# Patient Record
Sex: FEMALE | Race: WHITE | Employment: FULL TIME | URBAN - METROPOLITAN AREA
[De-identification: names, ages, dates, MRNs, and addresses within clinical notes are randomized per-mention and may not be internally consistent; named-entity substitution may affect disease eponyms.]

---

## 2018-02-05 ENCOUNTER — HOSPITAL ENCOUNTER (OUTPATIENT)
Dept: LAB | Age: 48
Discharge: HOME | End: 2018-02-05
Payer: COMMERCIAL

## 2018-02-05 ENCOUNTER — HOSPITAL ENCOUNTER (OUTPATIENT)
Dept: OTHER | Age: 48
Discharge: HOME | End: 2018-02-05
Payer: COMMERCIAL

## 2018-02-05 ENCOUNTER — HOSPITAL ENCOUNTER (OUTPATIENT)
Dept: PREADMISSION TESTING | Age: 48
Discharge: HOME | End: 2018-02-05
Payer: COMMERCIAL

## 2018-02-05 DIAGNOSIS — Z01.818 PRE-OP TESTING: ICD-10-CM

## 2018-02-05 LAB
ABO + RH BLD: NORMAL
ALBUMIN SERPL-MCNC: 4.6 G/DL (ref 3.4–5)
ALBUMIN/GLOB SERPL: 1.4 {RATIO} (ref 0.8–1.7)
ALP SERPL-CCNC: 91 U/L (ref 45–117)
ALT SERPL-CCNC: 62 U/L (ref 13–56)
ANION GAP SERPL CALC-SCNC: 8 MMOL/L (ref 3–18)
APPEARANCE UR: CLEAR
APTT PPP: 28.1 SEC (ref 23–36.4)
AST SERPL-CCNC: 37 U/L (ref 15–37)
BASOPHILS # BLD: 0 K/UL (ref 0–0.06)
BASOPHILS NFR BLD: 1 % (ref 0–2)
BILIRUB SERPL-MCNC: 1.2 MG/DL (ref 0.2–1)
BILIRUB UR QL: NEGATIVE
BLOOD GROUP ANTIBODIES SERPL: NORMAL
BUN SERPL-MCNC: 18 MG/DL (ref 7–18)
BUN/CREAT SERPL: 20 (ref 12–20)
CALCIUM SERPL-MCNC: 9.5 MG/DL (ref 8.5–10.1)
CHLORIDE SERPL-SCNC: 103 MMOL/L (ref 100–108)
CO2 SERPL-SCNC: 29 MMOL/L (ref 21–32)
COLOR UR: YELLOW
CREAT SERPL-MCNC: 0.92 MG/DL (ref 0.6–1.3)
DIFFERENTIAL METHOD BLD: ABNORMAL
EOSINOPHIL # BLD: 0.1 K/UL (ref 0–0.4)
EOSINOPHIL NFR BLD: 1 % (ref 0–5)
ERYTHROCYTE [DISTWIDTH] IN BLOOD BY AUTOMATED COUNT: 12.2 % (ref 11.6–14.5)
GLOBULIN SER CALC-MCNC: 3.2 G/DL (ref 2–4)
GLUCOSE SERPL-MCNC: 104 MG/DL (ref 74–99)
GLUCOSE UR STRIP.AUTO-MCNC: NEGATIVE MG/DL
HCT VFR BLD AUTO: 43.4 % (ref 35–45)
HGB BLD-MCNC: 14.7 G/DL (ref 12–16)
HGB UR QL STRIP: NEGATIVE
INR PPP: 1 (ref 0.8–1.2)
KETONES UR QL STRIP.AUTO: ABNORMAL MG/DL
LEUKOCYTE ESTERASE UR QL STRIP.AUTO: NEGATIVE
LYMPHOCYTES # BLD: 1.6 K/UL (ref 0.9–3.6)
LYMPHOCYTES NFR BLD: 36 % (ref 21–52)
MCH RBC QN AUTO: 29.8 PG (ref 24–34)
MCHC RBC AUTO-ENTMCNC: 33.9 G/DL (ref 31–37)
MCV RBC AUTO: 88 FL (ref 74–97)
MONOCYTES # BLD: 0.3 K/UL (ref 0.05–1.2)
MONOCYTES NFR BLD: 7 % (ref 3–10)
NEUTS SEG # BLD: 2.4 K/UL (ref 1.8–8)
NEUTS SEG NFR BLD: 55 % (ref 40–73)
NITRITE UR QL STRIP.AUTO: NEGATIVE
PH UR STRIP: 8 [PH] (ref 5–8)
PLATELET # BLD AUTO: 217 K/UL (ref 135–420)
PMV BLD AUTO: 11.3 FL (ref 9.2–11.8)
POTASSIUM SERPL-SCNC: 4.1 MMOL/L (ref 3.5–5.5)
PROT SERPL-MCNC: 7.8 G/DL (ref 6.4–8.2)
PROT UR STRIP-MCNC: NEGATIVE MG/DL
PROTHROMBIN TIME: 12.5 SEC (ref 11.5–15.2)
RBC # BLD AUTO: 4.93 M/UL (ref 4.2–5.3)
SODIUM SERPL-SCNC: 140 MMOL/L (ref 136–145)
SP GR UR REFRACTOMETRY: 1.01 (ref 1–1.03)
SPECIMEN EXP DATE BLD: NORMAL
UROBILINOGEN UR QL STRIP.AUTO: 0.2 EU/DL (ref 0.2–1)
WBC # BLD AUTO: 4.4 K/UL (ref 4.6–13.2)

## 2018-02-05 PROCEDURE — 81003 URINALYSIS AUTO W/O SCOPE: CPT | Performed by: NEUROLOGICAL SURGERY

## 2018-02-05 PROCEDURE — 93005 ELECTROCARDIOGRAM TRACING: CPT

## 2018-02-05 PROCEDURE — 36415 COLL VENOUS BLD VENIPUNCTURE: CPT | Performed by: NEUROLOGICAL SURGERY

## 2018-02-05 PROCEDURE — 86900 BLOOD TYPING SEROLOGIC ABO: CPT | Performed by: NEUROLOGICAL SURGERY

## 2018-02-05 PROCEDURE — 85730 THROMBOPLASTIN TIME PARTIAL: CPT | Performed by: NEUROLOGICAL SURGERY

## 2018-02-05 PROCEDURE — 85025 COMPLETE CBC W/AUTO DIFF WBC: CPT | Performed by: NEUROLOGICAL SURGERY

## 2018-02-05 PROCEDURE — 87086 URINE CULTURE/COLONY COUNT: CPT | Performed by: NEUROLOGICAL SURGERY

## 2018-02-05 PROCEDURE — 80053 COMPREHEN METABOLIC PANEL: CPT | Performed by: NEUROLOGICAL SURGERY

## 2018-02-05 PROCEDURE — 71046 X-RAY EXAM CHEST 2 VIEWS: CPT

## 2018-02-05 PROCEDURE — 85610 PROTHROMBIN TIME: CPT | Performed by: NEUROLOGICAL SURGERY

## 2018-02-05 RX ORDER — BISMUTH SUBSALICYLATE 262 MG
1 TABLET,CHEWABLE ORAL DAILY
COMMUNITY

## 2018-02-05 RX ORDER — METAPROTERENOL SULFATE 20 MG
2 TABLET ORAL DAILY
COMMUNITY

## 2018-02-05 RX ORDER — ASCORBIC ACID 500 MG
500 TABLET ORAL DAILY
COMMUNITY

## 2018-02-05 RX ORDER — LANOLIN ALCOHOL/MO/W.PET/CERES
1000 CREAM (GRAM) TOPICAL DAILY
COMMUNITY

## 2018-02-05 NOTE — PERIOP NOTES
PAT - SURGICAL PRE-ADMISSION INSTRUCTIONS    NAME:  Jalil Garcia                                                          TODAY'S DATE:  2/5/2018    SURGERY DATE:  2/13/2018                                  SURGERY ARRIVAL TIME:   0745    1. Do NOT eat or drink anything, including candy or gum, after MIDNIGHT on 02/12/18 , unless you have specific instructions from your Surgeon or Anesthesia Provider to do so. 2. No smoking on the day of surgery. 3. No alcohol 24 hours prior to the day of surgery. 4. No recreational drugs for one week prior to the day of surgery. 5. Leave all valuables, including money/purse, at home. 6. Remove all jewelry, nail polish, makeup (including mascara); no lotions, powders, deodorant, or perfume/cologne/after shave. 7. Glasses/Contact lenses and Dentures may be worn to the hospital.  They will be removed prior to surgery. 8. Call your doctor if symptoms of a cold or illness develop within 24 ours prior to surgery. 9. AN ADULT MUST DRIVE YOU HOME AFTER OUTPATIENT SURGERY. 10. If you are having an OUTPATIENT procedure, please make arrangements for a responsible adult to be with you for 24 hours after your surgery. 11. If you are admitted to the hospital, you will be assigned to a bed after surgery is complete. Normally a family member will not be able to see you until you are in your assigned bed. 15. Family is encouraged to accompany you to the hospital.  We ask visitors in the treatment area to be limited to ONE person at a time to ensure patient privacy. EXCEPTIONS WILL BE MADE AS NEEDED. 15. Children under 12 are discouraged from entering the treatment area and need to be supervised by an adult when in the waiting room. Special Instructions:     Take these medications the morning of surgery with a sip of water:  Prilosec, STOP anticoagulants AT LEAST 1 WEEK PRIOR to your surgery or, follow other MD instructions:  Stop all vitamins and supplements    Patient Prep:    use CHG solution    These surgical instructions were reviewed with Zuly during the PAT visit. A printed copy of the instructions was provided to [facility]. Directions: On the morning of surgery, please go to the [address] [facility]. Enter the building from the [facility] entrance, 1st floor (next to the Emergency Room entrance). Take the elevator to the 2nd floor. Sign in at the Registration Desk.     If you have any questions and/or concerns, please do not hesitate to call:  (Prior to the day of surgery)  [facility] [place] unit:  [phone]  (Day of surgery)  [facility] unit:  [phone]

## 2018-02-06 LAB
ATRIAL RATE: 73 BPM
CALCULATED P AXIS, ECG09: 74 DEGREES
CALCULATED R AXIS, ECG10: 64 DEGREES
CALCULATED T AXIS, ECG11: 52 DEGREES
DIAGNOSIS, 93000: NORMAL
P-R INTERVAL, ECG05: 118 MS
Q-T INTERVAL, ECG07: 384 MS
QRS DURATION, ECG06: 94 MS
QTC CALCULATION (BEZET), ECG08: 423 MS
VENTRICULAR RATE, ECG03: 73 BPM

## 2018-02-07 LAB
BACTERIA SPEC CULT: NORMAL
SERVICE CMNT-IMP: NORMAL

## 2018-02-08 RX ORDER — VANCOMYCIN/0.9 % SOD CHLORIDE 1 G/100 ML
1000 PLASTIC BAG, INJECTION (ML) INTRAVENOUS
Status: CANCELLED | OUTPATIENT
Start: 2018-02-13 | End: 2018-02-13

## 2018-02-12 ENCOUNTER — HOSPITAL ENCOUNTER (OUTPATIENT)
Dept: MRI IMAGING | Age: 48
Discharge: HOME | DRG: 027 | End: 2018-02-12
Attending: NEUROLOGICAL SURGERY
Payer: COMMERCIAL

## 2018-02-12 ENCOUNTER — ANESTHESIA EVENT (OUTPATIENT)
Dept: SURGERY | Age: 48
DRG: 027 | End: 2018-02-12
Payer: COMMERCIAL

## 2018-02-12 VITALS — WEIGHT: 113 LBS | BODY MASS INDEX: 20.67 KG/M2

## 2018-02-12 DIAGNOSIS — D49.6 BRAIN TUMOR (HCC): ICD-10-CM

## 2018-02-12 PROCEDURE — 74011250636 HC RX REV CODE- 250/636: Performed by: NEUROLOGICAL SURGERY

## 2018-02-12 PROCEDURE — A9577 INJ MULTIHANCE: HCPCS | Performed by: NEUROLOGICAL SURGERY

## 2018-02-12 PROCEDURE — 70552 MRI BRAIN STEM W/DYE: CPT

## 2018-02-12 RX ADMIN — GADOBENATE DIMEGLUMINE 10 ML: 529 INJECTION, SOLUTION INTRAVENOUS at 08:46

## 2018-02-13 ENCOUNTER — HOSPITAL ENCOUNTER (INPATIENT)
Age: 48
LOS: 2 days | Discharge: HOME | DRG: 027 | End: 2018-02-15
Attending: NEUROLOGICAL SURGERY | Admitting: NEUROLOGICAL SURGERY
Payer: COMMERCIAL

## 2018-02-13 ENCOUNTER — ANESTHESIA (OUTPATIENT)
Dept: SURGERY | Age: 48
DRG: 027 | End: 2018-02-13
Payer: COMMERCIAL

## 2018-02-13 DIAGNOSIS — D32.9 MENINGIOMA (HCC): Primary | ICD-10-CM

## 2018-02-13 PROBLEM — D49.6 BRAIN TUMOR (HCC): Status: ACTIVE | Noted: 2018-02-13

## 2018-02-13 LAB
ANION GAP SERPL CALC-SCNC: 12 MMOL/L (ref 3–18)
APPEARANCE UR: CLEAR
BILIRUB UR QL: NEGATIVE
BUN SERPL-MCNC: 17 MG/DL (ref 7–18)
BUN/CREAT SERPL: 20 (ref 12–20)
CALCIUM SERPL-MCNC: 8.8 MG/DL (ref 8.5–10.1)
CHLORIDE SERPL-SCNC: 105 MMOL/L (ref 100–108)
CO2 SERPL-SCNC: 24 MMOL/L (ref 21–32)
COLOR UR: YELLOW
CREAT SERPL-MCNC: 0.87 MG/DL (ref 0.6–1.3)
GLUCOSE SERPL-MCNC: 188 MG/DL (ref 74–99)
GLUCOSE UR STRIP.AUTO-MCNC: NEGATIVE MG/DL
HCG UR QL: NEGATIVE
HGB UR QL STRIP: NEGATIVE
KETONES UR QL STRIP.AUTO: 15 MG/DL
LEUKOCYTE ESTERASE UR QL STRIP.AUTO: NEGATIVE
NITRITE UR QL STRIP.AUTO: NEGATIVE
PH UR STRIP: 5.5 [PH] (ref 5–8)
POTASSIUM SERPL-SCNC: 4.3 MMOL/L (ref 3.5–5.5)
PROT UR STRIP-MCNC: NEGATIVE MG/DL
SODIUM SERPL-SCNC: 141 MMOL/L (ref 136–145)
SP GR UR REFRACTOMETRY: 1.02 (ref 1–1.03)
UROBILINOGEN UR QL STRIP.AUTO: 0.2 EU/DL (ref 0.2–1)

## 2018-02-13 PROCEDURE — 77030011256 HC DRSG MEPILEX <16IN NO BORD MOLN -A

## 2018-02-13 PROCEDURE — 74011000250 HC RX REV CODE- 250: Performed by: NEUROLOGICAL SURGERY

## 2018-02-13 PROCEDURE — 77030020480 HC CLP SCLP RANY DISP J&J -A: Performed by: NEUROLOGICAL SURGERY

## 2018-02-13 PROCEDURE — C1763 CONN TISS, NON-HUMAN: HCPCS | Performed by: NEUROLOGICAL SURGERY

## 2018-02-13 PROCEDURE — 74011250636 HC RX REV CODE- 250/636: Performed by: ANESTHESIOLOGY

## 2018-02-13 PROCEDURE — 77030037730 HC PTTY BN HEMOSRB ABRY -C: Performed by: NEUROLOGICAL SURGERY

## 2018-02-13 PROCEDURE — 00U20JZ SUPPLEMENT DURA MATER WITH SYNTHETIC SUBSTITUTE, OPEN APPROACH: ICD-10-PCS | Performed by: NEUROLOGICAL SURGERY

## 2018-02-13 PROCEDURE — 76060000038 HC ANESTHESIA 3.5 TO 4 HR: Performed by: NEUROLOGICAL SURGERY

## 2018-02-13 PROCEDURE — 77030029716 HC SEAL SPN HEMSTAT DURASL KT INLC -F: Performed by: NEUROLOGICAL SURGERY

## 2018-02-13 PROCEDURE — 77030022503 HC BLD MICRO FTHR MITY -B: Performed by: NEUROLOGICAL SURGERY

## 2018-02-13 PROCEDURE — 80048 BASIC METABOLIC PNL TOTAL CA: CPT | Performed by: NEUROLOGICAL SURGERY

## 2018-02-13 PROCEDURE — 77030018673: Performed by: NEUROLOGICAL SURGERY

## 2018-02-13 PROCEDURE — 77030030722 HC PIN SKULL MAYFLD INLC -B: Performed by: NEUROLOGICAL SURGERY

## 2018-02-13 PROCEDURE — 77030008477 HC STYL SATN SLP COVD -A: Performed by: ANESTHESIOLOGY

## 2018-02-13 PROCEDURE — 76010000174 HC OR TIME 3.5 TO 4 HR INTENSV-TIER 1: Performed by: NEUROLOGICAL SURGERY

## 2018-02-13 PROCEDURE — 74011000272 HC RX REV CODE- 272: Performed by: NEUROLOGICAL SURGERY

## 2018-02-13 PROCEDURE — 74011000250 HC RX REV CODE- 250

## 2018-02-13 PROCEDURE — 81025 URINE PREGNANCY TEST: CPT

## 2018-02-13 PROCEDURE — C1729 CATH, DRAINAGE: HCPCS | Performed by: NEUROLOGICAL SURGERY

## 2018-02-13 PROCEDURE — 77030032490 HC SLV COMPR SCD KNE COVD -B: Performed by: NEUROLOGICAL SURGERY

## 2018-02-13 PROCEDURE — 77030008683 HC TU ET CUF COVD -A: Performed by: ANESTHESIOLOGY

## 2018-02-13 PROCEDURE — 77030003028 HC SUT VCRL J&J -A: Performed by: NEUROLOGICAL SURGERY

## 2018-02-13 PROCEDURE — 77030014007 HC SPNG HEMSTAT J&J -B: Performed by: NEUROLOGICAL SURGERY

## 2018-02-13 PROCEDURE — 77030018390 HC SPNG HEMSTAT2 J&J -B: Performed by: NEUROLOGICAL SURGERY

## 2018-02-13 PROCEDURE — 65610000009 HC RM ICU NEURO

## 2018-02-13 PROCEDURE — 77030013473 HC CRD BPLR AESC -A: Performed by: NEUROLOGICAL SURGERY

## 2018-02-13 PROCEDURE — 88307 TISSUE EXAM BY PATHOLOGIST: CPT | Performed by: NEUROLOGICAL SURGERY

## 2018-02-13 PROCEDURE — 83036 HEMOGLOBIN GLYCOSYLATED A1C: CPT | Performed by: NEUROLOGICAL SURGERY

## 2018-02-13 PROCEDURE — 77030029099 HC BN WAX SSPC -A: Performed by: NEUROLOGICAL SURGERY

## 2018-02-13 PROCEDURE — 81003 URINALYSIS AUTO W/O SCOPE: CPT | Performed by: NEUROLOGICAL SURGERY

## 2018-02-13 PROCEDURE — 00B10ZX EXCISION OF CEREBRAL MENINGES, OPEN APPROACH, DIAGNOSTIC: ICD-10-PCS | Performed by: NEUROLOGICAL SURGERY

## 2018-02-13 PROCEDURE — 74011250636 HC RX REV CODE- 250/636

## 2018-02-13 PROCEDURE — 77030013079 HC BLNKT BAIR HGGR 3M -A: Performed by: ANESTHESIOLOGY

## 2018-02-13 PROCEDURE — 74011000258 HC RX REV CODE- 258

## 2018-02-13 PROCEDURE — 74011250637 HC RX REV CODE- 250/637

## 2018-02-13 PROCEDURE — 74011250636 HC RX REV CODE- 250/636: Performed by: NURSE ANESTHETIST, CERTIFIED REGISTERED

## 2018-02-13 PROCEDURE — 88331 PATH CONSLTJ SURG 1 BLK 1SPC: CPT | Performed by: NEUROLOGICAL SURGERY

## 2018-02-13 PROCEDURE — 36415 COLL VENOUS BLD VENIPUNCTURE: CPT | Performed by: NEUROLOGICAL SURGERY

## 2018-02-13 PROCEDURE — 0NR30JZ REPLACEMENT OF RIGHT PARIETAL BONE WITH SYNTHETIC SUBSTITUTE, OPEN APPROACH: ICD-10-PCS | Performed by: NEUROLOGICAL SURGERY

## 2018-02-13 PROCEDURE — 74011250637 HC RX REV CODE- 250/637: Performed by: NURSE PRACTITIONER

## 2018-02-13 PROCEDURE — 74011250637 HC RX REV CODE- 250/637: Performed by: NURSE ANESTHETIST, CERTIFIED REGISTERED

## 2018-02-13 PROCEDURE — 77030020236 HC IMPL CLMP CRNOFX AESC -B: Performed by: NEUROLOGICAL SURGERY

## 2018-02-13 PROCEDURE — 74011250637 HC RX REV CODE- 250/637: Performed by: NEUROLOGICAL SURGERY

## 2018-02-13 PROCEDURE — 74011250636 HC RX REV CODE- 250/636: Performed by: NEUROLOGICAL SURGERY

## 2018-02-13 PROCEDURE — 77030002946 HC SUT NRLN J&J -B: Performed by: NEUROLOGICAL SURGERY

## 2018-02-13 PROCEDURE — C1713 ANCHOR/SCREW BN/BN,TIS/BN: HCPCS | Performed by: NEUROLOGICAL SURGERY

## 2018-02-13 DEVICE — AGENT HEMSTAT 2GM ABSRB SYNTH BONE PUTTY W/ SPAT: Type: IMPLANTABLE DEVICE | Site: SKULL | Status: FUNCTIONAL

## 2018-02-13 DEVICE — DURA 62106 SUBSTITUTE DUREPAIR 1X3IN NCE
Type: IMPLANTABLE DEVICE | Site: BRAIN | Status: FUNCTIONAL
Brand: DUREPAIR®

## 2018-02-13 DEVICE — 10CC HYDROSET INJECTABLE CEMENT
Type: IMPLANTABLE DEVICE | Site: SKULL | Status: FUNCTIONAL
Brand: HYDROSET

## 2018-02-13 RX ORDER — SODIUM CHLORIDE, SODIUM LACTATE, POTASSIUM CHLORIDE, CALCIUM CHLORIDE 600; 310; 30; 20 MG/100ML; MG/100ML; MG/100ML; MG/100ML
50 INJECTION, SOLUTION INTRAVENOUS CONTINUOUS
Status: DISCONTINUED | OUTPATIENT
Start: 2018-02-14 | End: 2018-02-13 | Stop reason: HOSPADM

## 2018-02-13 RX ORDER — SODIUM CHLORIDE 9 MG/ML
INJECTION, SOLUTION INTRAVENOUS
Status: DISCONTINUED | OUTPATIENT
Start: 2018-02-13 | End: 2018-02-13 | Stop reason: HOSPADM

## 2018-02-13 RX ORDER — MIDAZOLAM HYDROCHLORIDE 1 MG/ML
INJECTION, SOLUTION INTRAMUSCULAR; INTRAVENOUS AS NEEDED
Status: DISCONTINUED | OUTPATIENT
Start: 2018-02-13 | End: 2018-02-13 | Stop reason: HOSPADM

## 2018-02-13 RX ORDER — TOPIRAMATE 25 MG/1
50 TABLET ORAL DAILY
Status: DISCONTINUED | OUTPATIENT
Start: 2018-02-13 | End: 2018-02-15 | Stop reason: HOSPADM

## 2018-02-13 RX ORDER — PROMETHAZINE HYDROCHLORIDE 25 MG/ML
INJECTION, SOLUTION INTRAMUSCULAR; INTRAVENOUS AS NEEDED
Status: DISCONTINUED | OUTPATIENT
Start: 2018-02-13 | End: 2018-02-13

## 2018-02-13 RX ORDER — PANTOPRAZOLE SODIUM 40 MG/1
40 TABLET, DELAYED RELEASE ORAL
Status: DISCONTINUED | OUTPATIENT
Start: 2018-02-14 | End: 2018-02-13 | Stop reason: SDUPTHER

## 2018-02-13 RX ORDER — SODIUM CHLORIDE 0.9 % (FLUSH) 0.9 %
5-10 SYRINGE (ML) INJECTION EVERY 8 HOURS
Status: DISCONTINUED | OUTPATIENT
Start: 2018-02-13 | End: 2018-02-13 | Stop reason: HOSPADM

## 2018-02-13 RX ORDER — FUROSEMIDE 10 MG/ML
INJECTION INTRAMUSCULAR; INTRAVENOUS AS NEEDED
Status: DISCONTINUED | OUTPATIENT
Start: 2018-02-13 | End: 2018-02-13 | Stop reason: HOSPADM

## 2018-02-13 RX ORDER — ACETAMINOPHEN 325 MG/1
650 TABLET ORAL
Status: DISCONTINUED | OUTPATIENT
Start: 2018-02-13 | End: 2018-02-15 | Stop reason: HOSPADM

## 2018-02-13 RX ORDER — SODIUM CHLORIDE 0.9 % (FLUSH) 0.9 %
5-10 SYRINGE (ML) INJECTION AS NEEDED
Status: DISCONTINUED | OUTPATIENT
Start: 2018-02-13 | End: 2018-02-13 | Stop reason: HOSPADM

## 2018-02-13 RX ORDER — MANNITOL 20 G/100ML
INJECTION, SOLUTION INTRAVENOUS
Status: DISCONTINUED | OUTPATIENT
Start: 2018-02-13 | End: 2018-02-13 | Stop reason: HOSPADM

## 2018-02-13 RX ORDER — LIDOCAINE HYDROCHLORIDE AND EPINEPHRINE 10; 10 MG/ML; UG/ML
INJECTION, SOLUTION INFILTRATION; PERINEURAL AS NEEDED
Status: DISCONTINUED | OUTPATIENT
Start: 2018-02-13 | End: 2018-02-13 | Stop reason: HOSPADM

## 2018-02-13 RX ORDER — NEOSTIGMINE METHYLSULFATE 5 MG/5 ML
SYRINGE (ML) INTRAVENOUS AS NEEDED
Status: DISCONTINUED | OUTPATIENT
Start: 2018-02-13 | End: 2018-02-13 | Stop reason: HOSPADM

## 2018-02-13 RX ORDER — FAMOTIDINE 20 MG/1
20 TABLET, FILM COATED ORAL ONCE
Status: COMPLETED | OUTPATIENT
Start: 2018-02-14 | End: 2018-02-13

## 2018-02-13 RX ORDER — DEXAMETHASONE SODIUM PHOSPHATE 4 MG/ML
2 INJECTION, SOLUTION INTRA-ARTICULAR; INTRALESIONAL; INTRAMUSCULAR; INTRAVENOUS; SOFT TISSUE EVERY 6 HOURS
Status: DISCONTINUED | OUTPATIENT
Start: 2018-02-17 | End: 2018-02-15 | Stop reason: HOSPADM

## 2018-02-13 RX ORDER — DEXAMETHASONE SODIUM PHOSPHATE 4 MG/ML
2 INJECTION, SOLUTION INTRA-ARTICULAR; INTRALESIONAL; INTRAMUSCULAR; INTRAVENOUS; SOFT TISSUE EVERY 8 HOURS
Status: DISCONTINUED | OUTPATIENT
Start: 2018-02-18 | End: 2018-02-15 | Stop reason: HOSPADM

## 2018-02-13 RX ORDER — ONDANSETRON 2 MG/ML
4 INJECTION INTRAMUSCULAR; INTRAVENOUS
Status: DISCONTINUED | OUTPATIENT
Start: 2018-02-13 | End: 2018-02-15 | Stop reason: HOSPADM

## 2018-02-13 RX ORDER — SCOLOPAMINE TRANSDERMAL SYSTEM 1 MG/1
PATCH, EXTENDED RELEASE TRANSDERMAL AS NEEDED
Status: DISCONTINUED | OUTPATIENT
Start: 2018-02-13 | End: 2018-02-13 | Stop reason: HOSPADM

## 2018-02-13 RX ORDER — DIPHENHYDRAMINE HCL 25 MG
25 CAPSULE ORAL
Status: DISCONTINUED | OUTPATIENT
Start: 2018-02-13 | End: 2018-02-13

## 2018-02-13 RX ORDER — BISACODYL 5 MG
10 TABLET, DELAYED RELEASE (ENTERIC COATED) ORAL DAILY PRN
Status: DISCONTINUED | OUTPATIENT
Start: 2018-02-13 | End: 2018-02-15 | Stop reason: HOSPADM

## 2018-02-13 RX ORDER — LABETALOL HYDROCHLORIDE 5 MG/ML
10-20 INJECTION, SOLUTION INTRAVENOUS
Status: DISCONTINUED | OUTPATIENT
Start: 2018-02-13 | End: 2018-02-15 | Stop reason: HOSPADM

## 2018-02-13 RX ORDER — VANCOMYCIN HYDROCHLORIDE 1 G/20ML
INJECTION, POWDER, LYOPHILIZED, FOR SOLUTION INTRAVENOUS AS NEEDED
Status: DISCONTINUED | OUTPATIENT
Start: 2018-02-13 | End: 2018-02-13 | Stop reason: HOSPADM

## 2018-02-13 RX ORDER — FAMOTIDINE 20 MG/1
TABLET, FILM COATED ORAL
Status: DISCONTINUED
Start: 2018-02-13 | End: 2018-02-13

## 2018-02-13 RX ORDER — DEXAMETHASONE SODIUM PHOSPHATE 4 MG/ML
4 INJECTION, SOLUTION INTRA-ARTICULAR; INTRALESIONAL; INTRAMUSCULAR; INTRAVENOUS; SOFT TISSUE EVERY 6 HOURS
Status: DISCONTINUED | OUTPATIENT
Start: 2018-02-13 | End: 2018-02-15 | Stop reason: HOSPADM

## 2018-02-13 RX ORDER — FENTANYL CITRATE 50 UG/ML
INJECTION, SOLUTION INTRAMUSCULAR; INTRAVENOUS AS NEEDED
Status: DISCONTINUED | OUTPATIENT
Start: 2018-02-13 | End: 2018-02-13 | Stop reason: HOSPADM

## 2018-02-13 RX ORDER — METOPROLOL TARTRATE 5 MG/5ML
2.5 INJECTION INTRAVENOUS
Status: DISCONTINUED | OUTPATIENT
Start: 2018-02-13 | End: 2018-02-15 | Stop reason: HOSPADM

## 2018-02-13 RX ORDER — LIDOCAINE HYDROCHLORIDE 20 MG/ML
INJECTION, SOLUTION EPIDURAL; INFILTRATION; INTRACAUDAL; PERINEURAL AS NEEDED
Status: DISCONTINUED | OUTPATIENT
Start: 2018-02-13 | End: 2018-02-13 | Stop reason: HOSPADM

## 2018-02-13 RX ORDER — DEXAMETHASONE SODIUM PHOSPHATE 4 MG/ML
4 INJECTION, SOLUTION INTRA-ARTICULAR; INTRALESIONAL; INTRAMUSCULAR; INTRAVENOUS; SOFT TISSUE EVERY 8 HOURS
Status: DISCONTINUED | OUTPATIENT
Start: 2018-02-16 | End: 2018-02-15 | Stop reason: HOSPADM

## 2018-02-13 RX ORDER — DIPHENHYDRAMINE HYDROCHLORIDE 50 MG/ML
25 INJECTION, SOLUTION INTRAMUSCULAR; INTRAVENOUS ONCE
Status: COMPLETED | OUTPATIENT
Start: 2018-02-13 | End: 2018-02-13

## 2018-02-13 RX ORDER — PROPOFOL 10 MG/ML
INJECTION, EMULSION INTRAVENOUS AS NEEDED
Status: DISCONTINUED | OUTPATIENT
Start: 2018-02-13 | End: 2018-02-13 | Stop reason: HOSPADM

## 2018-02-13 RX ORDER — DEXAMETHASONE SODIUM PHOSPHATE 4 MG/ML
INJECTION, SOLUTION INTRA-ARTICULAR; INTRALESIONAL; INTRAMUSCULAR; INTRAVENOUS; SOFT TISSUE AS NEEDED
Status: DISCONTINUED | OUTPATIENT
Start: 2018-02-13 | End: 2018-02-13 | Stop reason: HOSPADM

## 2018-02-13 RX ORDER — SODIUM CHLORIDE AND POTASSIUM CHLORIDE .9; .15 G/100ML; G/100ML
SOLUTION INTRAVENOUS CONTINUOUS
Status: DISCONTINUED | OUTPATIENT
Start: 2018-02-13 | End: 2018-02-15

## 2018-02-13 RX ORDER — DEXAMETHASONE SODIUM PHOSPHATE 4 MG/ML
2 INJECTION, SOLUTION INTRA-ARTICULAR; INTRALESIONAL; INTRAMUSCULAR; INTRAVENOUS; SOFT TISSUE EVERY 12 HOURS
Status: DISCONTINUED | OUTPATIENT
Start: 2018-02-19 | End: 2018-02-15 | Stop reason: HOSPADM

## 2018-02-13 RX ORDER — MORPHINE SULFATE 2 MG/ML
2-4 INJECTION, SOLUTION INTRAMUSCULAR; INTRAVENOUS
Status: DISCONTINUED | OUTPATIENT
Start: 2018-02-13 | End: 2018-02-15 | Stop reason: HOSPADM

## 2018-02-13 RX ORDER — DIPHENHYDRAMINE HYDROCHLORIDE 50 MG/ML
INJECTION, SOLUTION INTRAMUSCULAR; INTRAVENOUS
Status: COMPLETED
Start: 2018-02-13 | End: 2018-02-13

## 2018-02-13 RX ORDER — OXYCODONE AND ACETAMINOPHEN 5; 325 MG/1; MG/1
1-2 TABLET ORAL
Status: DISCONTINUED | OUTPATIENT
Start: 2018-02-13 | End: 2018-02-15 | Stop reason: HOSPADM

## 2018-02-13 RX ORDER — ONDANSETRON 2 MG/ML
INJECTION INTRAMUSCULAR; INTRAVENOUS AS NEEDED
Status: DISCONTINUED | OUTPATIENT
Start: 2018-02-13 | End: 2018-02-13 | Stop reason: HOSPADM

## 2018-02-13 RX ORDER — HYDROMORPHONE HYDROCHLORIDE 1 MG/ML
INJECTION, SOLUTION INTRAMUSCULAR; INTRAVENOUS; SUBCUTANEOUS AS NEEDED
Status: DISCONTINUED | OUTPATIENT
Start: 2018-02-13 | End: 2018-02-13 | Stop reason: HOSPADM

## 2018-02-13 RX ORDER — GLYCOPYRROLATE 0.2 MG/ML
INJECTION INTRAMUSCULAR; INTRAVENOUS AS NEEDED
Status: DISCONTINUED | OUTPATIENT
Start: 2018-02-13 | End: 2018-02-13 | Stop reason: HOSPADM

## 2018-02-13 RX ORDER — VECURONIUM BROMIDE FOR INJECTION 1 MG/ML
INJECTION, POWDER, LYOPHILIZED, FOR SOLUTION INTRAVENOUS AS NEEDED
Status: DISCONTINUED | OUTPATIENT
Start: 2018-02-13 | End: 2018-02-13 | Stop reason: HOSPADM

## 2018-02-13 RX ADMIN — FAMOTIDINE 20 MG: 10 INJECTION, SOLUTION INTRAVENOUS at 21:44

## 2018-02-13 RX ADMIN — LIDOCAINE HYDROCHLORIDE 50 MG: 20 INJECTION, SOLUTION EPIDURAL; INFILTRATION; INTRACAUDAL; PERINEURAL at 10:44

## 2018-02-13 RX ADMIN — MIDAZOLAM HYDROCHLORIDE 2 MG: 1 INJECTION, SOLUTION INTRAMUSCULAR; INTRAVENOUS at 10:39

## 2018-02-13 RX ADMIN — FENTANYL CITRATE 50 MCG: 50 INJECTION, SOLUTION INTRAMUSCULAR; INTRAVENOUS at 10:56

## 2018-02-13 RX ADMIN — SODIUM CHLORIDE, POTASSIUM CHLORIDE, SODIUM LACTATE AND CALCIUM CHLORIDE 50 ML/HR: 600; 310; 30; 20 INJECTION, SOLUTION INTRAVENOUS at 08:44

## 2018-02-13 RX ADMIN — ONDANSETRON 4 MG: 2 INJECTION INTRAMUSCULAR; INTRAVENOUS at 22:03

## 2018-02-13 RX ADMIN — VECURONIUM BROMIDE FOR INJECTION 1 MG: 1 INJECTION, POWDER, LYOPHILIZED, FOR SOLUTION INTRAVENOUS at 12:48

## 2018-02-13 RX ADMIN — TOPIRAMATE 50 MG: 25 TABLET, FILM COATED ORAL at 20:48

## 2018-02-13 RX ADMIN — Medication 3 MG: at 13:29

## 2018-02-13 RX ADMIN — SODIUM CHLORIDE 1000 MG: 900 INJECTION, SOLUTION INTRAVENOUS at 08:47

## 2018-02-13 RX ADMIN — FAMOTIDINE 20 MG: 10 INJECTION, SOLUTION INTRAVENOUS at 16:03

## 2018-02-13 RX ADMIN — DEXAMETHASONE SODIUM PHOSPHATE 10 MG: 4 INJECTION, SOLUTION INTRA-ARTICULAR; INTRALESIONAL; INTRAMUSCULAR; INTRAVENOUS; SOFT TISSUE at 10:52

## 2018-02-13 RX ADMIN — GLYCOPYRROLATE 0.4 MG: 0.2 INJECTION INTRAMUSCULAR; INTRAVENOUS at 13:29

## 2018-02-13 RX ADMIN — OXYCODONE HYDROCHLORIDE AND ACETAMINOPHEN 1 TABLET: 5; 325 TABLET ORAL at 19:25

## 2018-02-13 RX ADMIN — PROPOFOL 150 MG: 10 INJECTION, EMULSION INTRAVENOUS at 10:44

## 2018-02-13 RX ADMIN — DEXAMETHASONE SODIUM PHOSPHATE 4 MG: 4 INJECTION, SOLUTION INTRAMUSCULAR; INTRAVENOUS at 15:54

## 2018-02-13 RX ADMIN — DIPHENHYDRAMINE HYDROCHLORIDE 25 MG: 50 INJECTION, SOLUTION INTRAMUSCULAR; INTRAVENOUS at 14:47

## 2018-02-13 RX ADMIN — ONDANSETRON 4 MG: 2 INJECTION INTRAMUSCULAR; INTRAVENOUS at 12:56

## 2018-02-13 RX ADMIN — VECURONIUM BROMIDE FOR INJECTION 2 MG: 1 INJECTION, POWDER, LYOPHILIZED, FOR SOLUTION INTRAVENOUS at 11:19

## 2018-02-13 RX ADMIN — DEXAMETHASONE SODIUM PHOSPHATE 4 MG: 4 INJECTION, SOLUTION INTRAMUSCULAR; INTRAVENOUS at 20:49

## 2018-02-13 RX ADMIN — FAMOTIDINE 20 MG: 20 TABLET, FILM COATED ORAL at 08:44

## 2018-02-13 RX ADMIN — VECURONIUM BROMIDE FOR INJECTION 1 MG: 1 INJECTION, POWDER, LYOPHILIZED, FOR SOLUTION INTRAVENOUS at 12:10

## 2018-02-13 RX ADMIN — SCOLOPAMINE TRANSDERMAL SYSTEM 1 PATCH: 1 PATCH, EXTENDED RELEASE TRANSDERMAL at 10:35

## 2018-02-13 RX ADMIN — MANNITOL: 20 INJECTION, SOLUTION INTRAVENOUS at 11:48

## 2018-02-13 RX ADMIN — FUROSEMIDE 20 MG: 10 INJECTION INTRAMUSCULAR; INTRAVENOUS at 10:49

## 2018-02-13 RX ADMIN — SODIUM CHLORIDE: 9 INJECTION, SOLUTION INTRAVENOUS at 10:17

## 2018-02-13 RX ADMIN — FENTANYL CITRATE 50 MCG: 50 INJECTION, SOLUTION INTRAMUSCULAR; INTRAVENOUS at 10:44

## 2018-02-13 RX ADMIN — HYDROMORPHONE HYDROCHLORIDE 0.5 MG: 1 INJECTION, SOLUTION INTRAMUSCULAR; INTRAVENOUS; SUBCUTANEOUS at 13:30

## 2018-02-13 RX ADMIN — DIPHENHYDRAMINE HYDROCHLORIDE 25 MG: 50 INJECTION, SOLUTION INTRAMUSCULAR; INTRAVENOUS at 09:49

## 2018-02-13 RX ADMIN — SODIUM CHLORIDE AND POTASSIUM CHLORIDE: 9; 1.49 INJECTION, SOLUTION INTRAVENOUS at 15:54

## 2018-02-13 RX ADMIN — VECURONIUM BROMIDE FOR INJECTION 5 MG: 1 INJECTION, POWDER, LYOPHILIZED, FOR SOLUTION INTRAVENOUS at 10:48

## 2018-02-13 RX ADMIN — MORPHINE SULFATE 2 MG: 2 INJECTION, SOLUTION INTRAMUSCULAR; INTRAVENOUS at 22:08

## 2018-02-13 RX ADMIN — PROPOFOL 50 MG: 10 INJECTION, EMULSION INTRAVENOUS at 10:45

## 2018-02-13 RX ADMIN — HYDROMORPHONE HYDROCHLORIDE 0.5 MG: 1 INJECTION, SOLUTION INTRAMUSCULAR; INTRAVENOUS; SUBCUTANEOUS at 11:21

## 2018-02-13 RX ADMIN — SODIUM CHLORIDE: 9 INJECTION, SOLUTION INTRAVENOUS at 13:30

## 2018-02-13 NOTE — BRIEF OP NOTE
BRIEF OPERATIVE NOTE    Date of Procedure: 2/13/2018   Preoperative Diagnosis: BRAIN TUMOR  Postoperative Diagnosis: BRAIN TUMOR    Procedure(s):  IMAGE GUIDED RIGHT FRONTAL CRANIOTOMY FOR TUMOR RESECTION  Surgeon(s) and Role:     * Baron Emanuel MD - Primary         Assistant Staff: None      Surgical Staff:  Circ-1: Danilo Maciel RN  Circ-Relief: Jane Molina RN  Scrub Tech-1: Gabrielle Robles  Scrub Tech-Relief: Radonna Back  Surg Asst-1: Lynnann Pill  Surg Asst-Relief: Deboraha Muck  Event Time In   Incision Start 1127   Incision Close 1416     Anesthesia: General   Estimated Blood Loss: 50  Specimens:   ID Type Source Tests Collected by Time Destination   1 : RIGHT FRONTAL TUMOR  Frozen Section   Baron Emanuel MD 2/13/2018 1227 Pathology   2 : [place]ora TUMOR  Preservative Brain  Baron Emanuel MD 2/13/2018 1235 Pathology      Findings:    Complications:   Implants:   Implant Name Type Inv.  Item Serial No.  Lot No. LRB No. Used Action   PUTTY BNE HEMOSTAT SM 2GR -- HEMOSORB W/SPATULA - [id number]  PUTTY BNE HEMOSTAT SM 2GR -- HEMOSORB W/SPATULA [id number] Right 2 Implanted   GRAFT DURA SUB DUREPAIR 1X3IN --  - [id number]  GRAFT DURA SUB DUREPAIR 1X3IN --   [employer] [id number] N/A 1 Implanted   HYDROSET 10CC (HYDROXYAPATITE) - [id number] Bone HYDROSET 10CC (HYDROXYAPATITE)  MARYANN CAMELIA [id number] Right 1 Implanted   CLAMP CRAN FIX IMPL 11MM TI -- CRANIOFIX2 - [id number]   CLAMP CRAN FIX IMPL 11MM TI -- [id number]   [employer] [id number] Right 3 Implanted

## 2018-02-13 NOTE — IP AVS SNAPSHOT
[address] Patient: Raymon Trinidad MRN: [id number] :1970 About your hospitalization You were admitted on:  2018 You last received care in the:  [address] [place] [address],[address] You were discharged on:  February 15, 2018 Why you were hospitalized Your primary diagnosis was:  Not on File Your diagnoses also included:  Brain Tumor (Hcc) Follow-up Information Follow up With Details Comments Contact Info Jase Pan MD In 4 weeks For wound re-check [address] [address] [address] 
[phone] Marleni Flowers MD   [address] [facility] [address] [facility] [address] 
[phone] Marleni Flowers MD In 1 week  [address] [facility] [address] [facility] [address] 
[phone] Jase Pan MD On 2018 at 21  for staple removal [address] [facility] [address] 
[phone] Discharge Orders None A check gui indicates which time of day the medication should be taken. My Medications START taking these medications Instructions Each Dose to Equal  
 Morning Noon Evening Bedtime  
 dexamethasone 4 mg tablet Commonly known as:  DECADRON Your last dose was: Your next dose is: Take 4 mg every 6hrs for 7 doses (2/15-), then 4 mg every 8hrs for 3 doses (), then 2mg every 6hrs for 4 doses (), then 2 mg every 8hrs for 3 doses (), and then 2mg every 12 hours for 2 doses (). oxyCODONE-acetaminophen 5-325 mg per tablet Commonly known as:  PERCOCET Your last dose was: Your next dose is: Take 1-2 Tabs by mouth every six (6) hours as needed. Max Daily Amount: 8 Tabs. Indications: Pain 1-2 Tab CONTINUE taking these medications Instructions Each Dose to Equal  
 Morning Noon Evening Bedtime ALIGN 4 mg Cap Generic drug:  Bifidobacterium Infantis Your last dose was: Your next dose is: Take 4 mg by mouth daily. 4 mg  
    
   
   
   
  
 ascorbic acid (vitamin C) 500 mg tablet Commonly known as:  VITAMIN C Your last dose was: Your next dose is: Take 500 mg by mouth daily. 500 mg  
    
   
   
   
  
 calcium citrate 200 mg (950 mg) tablet Your last dose was: Your next dose is: Take 630 mg by mouth daily. 630 mg Cetirizine 10 mg Cap Your last dose was: Your next dose is: Take 10 mg by mouth daily. 10 mg  
    
   
   
   
  
 cyanocobalamin 1,000 mcg tablet Your last dose was: Your next dose is: Take 1,000 mcg by mouth daily. 1000 mcg Gxwtfjon-Tkpp-Ndwibm-Hyalur Ac 390-056-25-2 mg Cap Your last dose was: Your next dose is: Take 2 Tabs by mouth daily. 2 Tab  
    
   
   
   
  
 multivitamin tablet Commonly known as:  ONE A DAY Your last dose was: Your next dose is: Take 1 Tab by mouth daily. 1 Tab PriLOSEC OTC 20 mg tablet Generic drug:  omeprazole Your last dose was: Your next dose is: Take 20 mg by mouth daily. 20 mg  
    
   
   
   
  
 topiramate 50 mg tablet Commonly known as:  TOPAMAX Your last dose was: Your next dose is:    
   
   
 TK 1 T PO QPM  
     
   
   
   
  
 VITAMIN D3 1,000 unit Cap Generic drug:  cholecalciferol Your last dose was: Your next dose is: Take 5,000 Units by mouth daily. 5000 Units  
    
   
   
   
  
 vitamin E acetate 400 unit Cap capsule Your last dose was: Your next dose is: Take 400 Units by mouth daily. 400 Units Where to Get Your Medications Information on where to get these meds will be given to you by the nurse or doctor. ! Ask your nurse or doctor about these medications  
  dexamethasone 4 mg tablet  
 oxyCODONE-acetaminophen 5-325 mg per tablet Discharge Instructions [facility] [address] [facility], [place] [address] Phone:  [phone] Fax:  [phone] Dr. Allyson Lucia Discharge Instructions: With your recent surgery it is not unusual to experience some pain or discomfort in the area of previous pain or the incision. Accordingly, you have been given pain medication for your comfort until the healing process is further along. As time progresses, you should notice the frequency and the intensity of your discomfort steadily decrease. Here are some simple post-operative instructions to help during your home convalescence. 
  
1. Use your pain medication as directed. Refills should be requested during regular office hours and not on weekends by calling [phone]. Apply over the counter bacitracin to forehead three times a day. Decadron is a steroid medication and needs to be taken as directed. You need to take 4mg every 6 hours for 7 doses: 2/15/18 starting at 1200 today. Then continue at 1800, MN, and 0600. Then start 4 mg every 8 hours for 3 doses, then 2mg every 6hours for 4 doses, then 2mg every 8 hours for 3 doses, then 2mg every 12 hours for 2 doses. After this medication should be complete.   
  
2. Continue any regular medicine for other conditions (e.g. blood pressure, diabetes, heart, heart problems, etc.) 
  
3. You may ride in a car for short trips. Dr. Glenys Kapadia will discuss with you when you can drive. 
  
4. No bending, lifting or strenuous activities. If you need to get to the floor, squat instead of bending. 
  
5.  Showers are fine and you may wash your hair.   
  
 6. Avoid exercises except for walking. Begin with frequent, but short, periods of walking and gradually build up to longer periods of time. 
  
7. Sit for short periods of time (10-15 minutes) in the first week after surgery. 
  
8. You may resume sexual relations as comfort level allows. 
  
9. Notify us if you develop fever, painful redness around the incision or drainage from the wound. 
  
10. Your follow-up appointment is 2/21/18 at 21 [phone] with Dr. Darnell Sheriff.   
  
 
 
      If you have any questions, please contact our office at [phone]   Thank You Patient Signature: ________________________  Date: February 15, 2018 DISCHARGE SUMMARY from Nurse PATIENT INSTRUCTIONS: 
 
After general anesthesia or intravenous sedation, for 24 hours or while taking prescription Narcotics: · Limit your activities · Do not drive and operate hazardous machinery · Do not make important personal or business decisions · Do  not drink alcoholic beverages · If you have not urinated within 8 hours after discharge, please contact your surgeon on call. Report the following to your surgeon: 
· Excessive pain, swelling, redness or odor of or around the surgical area · Temperature over 100.5 · Nausea and vomiting lasting longer than 4 hours or if unable to take medications · Any signs of decreased circulation or nerve impairment to extremity: change in color, persistent  numbness, tingling, coldness or increase pain · Any questions What to do at Home: 
Recommended activity: No lifting, Driving, or Strenuous exercise until cleared by surgeon. Neosporin three times a day to forehead. If you experience any of the following symptoms severe pain, nausea and vomiting, fever above 100.5, bleeding or drainage from incision, shortness of breath, please follow up with Dr. Darnell Sheriff. *  Please give a list of your current medications to your Primary Care Provider. *  Please update this list whenever your medications are discontinued, doses are 
    changed, or new medications (including over-the-counter products) are added. *  Please carry medication information at all times in case of emergency situations. These are general instructions for a healthy lifestyle: No smoking/ No tobacco products/ Avoid exposure to second hand smoke Surgeon General's Warning:  Quitting smoking now greatly reduces serious risk to your health. Obesity, smoking, and sedentary lifestyle greatly increases your risk for illness A healthy diet, regular physical exercise & weight monitoring are important for maintaining a healthy lifestyle You may be retaining fluid if you have a history of heart failure or if you experience any of the following symptoms:  Weight gain of 3 pounds or more overnight or 5 pounds in a week, increased swelling in our hands or feet or shortness of breath while lying flat in bed. Please call your doctor as soon as you notice any of these symptoms; do not wait until your next office visit. Recognize signs and symptoms of STROKE: 
 
F-face looks uneven A-arms unable to move or move unevenly S-speech slurred or non-existent T-time-call 911 as soon as signs and symptoms begin-DO NOT go Back to bed or wait to see if you get better-TIME IS BRAIN. Warning Signs of HEART ATTACK Call 911 if you have these symptoms: 
? Chest discomfort. Most heart attacks involve discomfort in the center of the chest that lasts more than a few minutes, or that goes away and comes back. It can feel like uncomfortable pressure, squeezing, fullness, or pain. ? Discomfort in other areas of the upper body. Symptoms can include pain or discomfort in one or both arms, the back, neck, jaw, or stomach. ? Shortness of breath with or without chest discomfort. ? Other signs may include breaking out in a cold sweat, nausea, or lightheadedness. Don't wait more than five minutes to call [address]! Fast action can save your life. Calling 911 is almost always the fastest way to get lifesaving treatment. Emergency Medical Services staff can begin treatment when they arrive  up to an hour sooner than if someone gets to the hospital by car. The discharge information has been reviewed with the patient. The patient verbalized understanding. Discharge medications reviewed with the patient and appropriate educational materials and side effects teaching were provided. Patient armband removed and shredded. ___________________________________________________________________________________________________________________________________ Wevebobhart Announcement We are excited to announce that we are making your provider's discharge notes available to you in Friendfer. You will see these notes when they are completed and signed by the physician that discharged you from your recent hospital stay. If you have any questions or concerns about any information you see in Friendfer, please call the Health Information Department where you were seen or reach out to your Primary Care Provider for more information about your plan of care. Introducing [facility]! Dear Batool Argueta: Thank you for requesting a Friendfer account. Our records indicate that you already have an active Friendfer account. You can access your account anytime at [email or web address] Did you know that you can access your hospital and ER discharge instructions at any time in Friendfer? You can also review all of your test results from your hospital stay or ER visit. Additional Information If you have questions, please visit the Frequently Asked Questions section of the Friendfer website at [email or web address] Remember, Friendfer is NOT to be used for urgent needs. For medical emergencies, dial 911. Now available from your iPhone and Android! Providers Seen During Your Hospitalization Provider Specialty Primary office phone Clemente Alcazar MD Neurosurgery [phone] Your Primary Care Physician (PCP) Primary Care Physician Office Phone Office Fax Edward Best [phone] You are allergic to the following Allergen Reactions Pcn (Penicillins) Hives Recent Documentation Height Weight BMI OB Status Smoking Status 1.575 m 52.7 kg 21.25 kg/m2 Hysterectomy Never Smoker Emergency Contacts Name Discharge Info Relation Home Work Mobile Truman Willett DISCHARGE CAREGIVER [3] Spouse [3]   [phone] Patient Belongings The following personal items are in your possession at time of discharge: 
  Dental Appliances: None  Visual Aid: Contacts, With patient      Home Medications: None   Jewelry: None  Clothing: Undergarments, Footwear, Socks, Pants, Sweater    Other Valuables: Eyeglasses Discharge Instructions Attachments/References CRANIOTOMY: POST-OP (ENGLISH) PRESSURE INJURIES (ENGLISH) OXYCODONE/ACETAMINOPHEN (BY MOUTH) (ENGLISH) DEXAMETHASONE (BY MOUTH) (ENGLISH) Patient Handouts Craniotomy: What to Expect at [facility] Your Recovery A craniotomy is surgery to open your skull to fix a problem in your brain. It can be done for many reasons. For example, you may need a craniotomy if your brain or blood vessels are damaged or if you have a tumor or an infection in your brain. You will probably feel very tired for several weeks after surgery. You may also have headaches or problems concentrating. It can take 4 to 8 weeks to recover from surgery. Your cuts (incisions) may be sore for about 5 days after surgery. You may also have numbness and shooting pains near your wound, or swelling and bruising around your eyes.  As your wound starts to heal, it may begin to itch. Medicines and ice packs can help with headaches, pain, swelling, and itching. The stitches that hold your incisions together may go away on their own or will be removed in 7 to 10 days. This depends on the type of stitches the doctor uses. It is common for your scalp to swell with fluid. After the swelling goes down, you may have a dent in your head. Some kinds of plates stay attached to hold the skull flap to your head. If your head was shaved, you may want to wear hats or scarves on your head until your hair grows back. Or, it may not bother you. This care sheet gives you a general idea about how long it will take for you to recover. But each person recovers at a different pace. Follow the steps below to get better as quickly as possible. How can you care for yourself at home? Activity ? · Rest when you feel tired. It is normal to want to sleep during the day. It is a good idea to plan to take a nap every day. Getting enough sleep will help you recover. ? · Try not to lie flat when you rest or sleep. You can use a wedge pillow, or you can put a rolled towel or foam padding under your pillow. You can also raise the head of your bed by putting bricks or wooden blocks under the bed legs. ? · After lying down, bring your head up slowly. This can prevent headaches or dizziness. ? · You can wash your hair 2 to 3 days after your surgery. But do not soak your head or swim for 2 to 3 weeks. ? · Do not dye or color your hair for 4 weeks after your surgery. ? · Try to walk each day. Start by walking a little more than you did the day before. Bit by bit, increase the amount you walk. Walking boosts blood flow and helps prevent pneumonia and constipation. ? · Avoid heavy lifting until your doctor says it is okay. ? · Do not drive for 2 to 3 weeks or until your doctor says it is okay. When you begin driving again, start with short, familiar routes in the daytime. ? · Ask your doctor if it is safe for you to travel by plane. Diet ? · You can eat your normal diet. If your stomach is upset, try bland, low-fat foods like plain rice, broiled chicken, toast, and yogurt. ? · Follow your doctor's orders about how much fluid you should drink after surgery. ? · Do not drink alcohol until your doctor says it is okay. ? · You may notice that your bowel movements are not regular right after your surgery. This is common. Try to avoid constipation and straining with bowel movements. You may want to take a fiber supplement every day. If you have not had a bowel movement after a couple of days, ask your doctor about taking a mild laxative. Medicines ? · Your doctor will tell you if and when you can restart your medicines. He or she will also give you instructions about taking any new medicines. ? · If you take blood thinners, such as warfarin (Coumadin), clopidogrel (Plavix), or aspirin, be sure to talk to your doctor. He or she will tell you if and when to start taking those medicines again. Make sure that you understand exactly what your doctor wants you to do. ? · Be safe with medicines. Take pain medicines exactly as directed. ¨ If the doctor gave you a prescription medicine for pain, take it as prescribed. ¨ If you are not taking a prescription pain medicine, ask your doctor if you can take an over-the-counter medicine. ? · If you think your pain medicine is making you sick to your stomach: 
¨ Take your medicine after meals (unless your doctor has told you not to). ¨ Ask your doctor for a different pain medicine. ? · If your doctor prescribed antibiotics, take them as directed. Do not stop taking them just because you feel better. You need to take the full course of antibiotics. ? · If you get medicines to prevent seizures, take them exactly as directed. Incision care ?  · If you have strips of tape on the incisions, leave the tape on for a week or until it falls off.  
? · Keep the area clean and dry. Change the bandage every 2 days, or if it gets wet or soiled. ? · After your doctor says it is okay to shower or bathe, gently wash the surgery area with warm, soapy water and pat it dry. Exercise ? · Avoid risky activities, such as climbing a ladder, for 3 months after surgery. ? · Avoid strenuous activities, such as bicycle riding, jogging, weight lifting, or aerobic exercise, for 3 months or until your doctor says it is okay. ? · Do not play any rough or contact sports for 3 months or until your doctor says it is okay. ?Ice 
? · For the first 1 or 2 days, you can use ice to reduce pain, swelling, and itching. Put ice or a cold pack on your head for 10 to 20 minutes at a time. Put a thin cloth between the ice and your skin. Follow-up care is a key part of your treatment and safety. Be sure to make and go to all appointments, and call your doctor if you are having problems. It's also a good idea to know your test results and keep a list of the medicines you take. When should you call for help? Call 911 anytime you think you may need emergency care. For example, call if: 
? · You passed out (lost consciousness). ? · You have severe trouble breathing. ? · You have sudden chest pain and shortness of breath, or you cough up blood. ? · It is hard to think, move, speak, or see. ? · Your body is jerking or shaking. ?Call your doctor now or seek immediate medical care if: 
? · You have trouble thinking clearly. ? · You have a fever with a stiff neck or a severe headache. ? · Your incision comes open. ? · You have signs of infection, such as: 
¨ Increased pain, swelling, warmth, or redness. ¨ Red streaks leading from the incision. ¨ Pus draining from the incision. ¨ Swollen lymph nodes in your neck, armpits, or groin. ¨ A fever. ? · You have any sudden vision changes. ? · You have new or worse headaches. ? · You fall and hit your head. ? · You are sleeping more than you are awake. ? · You have pain that does not get better after you take pain medicine. ? · You have a fever over 100°F.  
? · You have a headache and you throw up. ? Watch closely for changes in your health, and be sure to contact your doctor if you have any problems. Where can you learn more? Go to http://quirino-iban.info/. Enter 26 [id number] in the search box to learn more about \"Craniotomy: What to Expect at Home. \" Current as of: March 20, 2017 Content Version: 11.4 © 2488-3487 Bday. Care instructions adapted under license by [employer] (which disclaims liability or warranty for this information). If you have questions about a medical condition or this instruction, always ask your healthcare professional. [place] [address] any warranty or liability for your use of this information. Pressure Injuries: Care Instructions Your Care Instructions A pressure injury on the skin is caused by constant pressure to that area. These injuries-also called decubitus ulcers or bedsores-may happen when you lie in bed or sit in a wheelchair for a long time. The constant pressure blocks the blood supply to the skin. This causes skin cells to die and creates a sore. Pressure injuries usually occur over bony areas, such as the hips, lower back, elbows, heels, and shoulders. They also can occur in places where the skin folds over on itself. You may have mild redness or open sores that are harder to heal. 
Good care at home can help heal pressure injuries. You or your caregiver needs to check your skin every day for sores. You need good nutrition and plenty of fluids to keep your skin healthy and prevent new pressure injuries. Follow-up care is a key part of your treatment and safety.  Be sure to make and go to all appointments, and call your doctor if you are having problems. It's also a good idea to know your test results and keep a list of the medicines you take. How can you care for yourself at home? · If your doctor prescribed a medicated ointment or cream, use it exactly as prescribed. Call your doctor if you think you are having a problem with your medicine. · Wash pressure injuries every day, or as often as your doctor recommends. Most tap water is safe, but follow the advice of your doctor or nurse. He or she may recommend that you use a saline solution. This is a salt and water solution that you can buy over the counter. · Put on bandages as your doctor or wound care specialist says. · Keep healthy tissue around the sore clean and dry. · Check your skin every day for sores (or have a caregiver do it). · If you know what is causing the pressure that caused the sore, find a way to remove that pressure. To prevent pressure injuries · Change your position or have your caregiver help you change your position often. You may need to do this every 2 hours if you are in bed or every 15 minutes if you are in a wheelchair. This lowers the chance of making sores worse and getting new sores. · Use special mattresses or other support. These may include low-pressure mattresses or cushions made of foam that can be filled with air, water, beads, or fiber. · Eat healthy foods with plenty of protein to help heal damaged skin and to help new skin grow. · Try to stay at a healthy weight. Being overweight can lead to more pressure on your skin. · Do not slide across sheets or slump in a chair or bed. · Do not smoke. Smoking dries the skin and reduces its blood supply. If you need help quitting, talk to your doctor about stop-smoking programs and medicines. These can increase your chances of quitting for good. When should you call for help? Call your doctor now or seek immediate medical care if: 
? · You have signs of infection, such as: ¨ Increased pain, swelling, warmth, or redness. ¨ Red streaks leading from the sore. ¨ Pus draining from the sore. ¨ A fever. ? Watch closely for changes in your health, and be sure to contact your doctor if: 
? · Your pressure injuries are not healing. ? · You have new pressure injuries. ? · You need help changing positions in bed or in a chair. ? · Your caregiver needs help to move you. Where can you learn more? Go to http://quirino-iban.info/. Enter F114 in the search box to learn more about \"Pressure Injuries: Care Instructions. \" Current as of: March 20, 2017 Content Version: 11.4 © 5929-4237 Conject. Care instructions adapted under license by [employer] (which disclaims liability or warranty for this information). If you have questions about a medical condition or this instruction, always ask your healthcare professional. [place] [address] any warranty or liability for your use of this information. Oxycodone/Acetaminophen (By mouth) Acetaminophen (d-khzx-w-MIN-oh-fen), Oxycodone Hydrochloride (lv-b-HJA-done lesly-droe-KLOR-aislinn) Treats moderate to moderately severe pain. This medicine is a narcotic pain reliever. Brand Name(s): Endocet, Percocet, Primlev, Xartemis XR There may be other brand names for this medicine. When This Medicine Should Not Be Used: This medicine is not right for everyone. Do not use it if you had an allergic reaction to acetaminophen or oxycodone, or if you have serious breathing problems or paralytic ileus. How to Use This Medicine:  
Capsule, Liquid, Tablet, Long Acting Tablet · Your doctor will tell you how much medicine to use. Do not use more than directed. · An overdose can be dangerous. Follow directions carefully so you do not get too much medicine at one time. · Oral liquid: Measure the oral liquid medicine with a marked measuring spoon, oral syringe, or medicine cup. · Swallow the extended-release tablet whole. Do not crush, break, or chew it. Do not lick or wet the tablet before placing it in your mouth. Do not give this medicine through a feeding tube. · This medicine should come with a Medication Guide. Ask your pharmacist for a copy if you do not have one. · Missed dose: If you miss a dose of this medicine, skip the missed dose and go back to your regular dosing schedule. Do not double doses. · Store the medicine in a closed container at room temperature, away from heat, moisture, and direct light. Ask your pharmacist about the best way to dispose of medicine you do not use. Drugs and Foods to Avoid: Ask your doctor or pharmacist before using any other medicine, including over-the-counter medicines, vitamins, and herbal products. · Do not use Xartemis XR if you are using or have used an MAO inhibitor in the past 14 days. · Some medicines can affect how this medicine works. Tell your doctor if you are using any of the following: ¨ Carbamazepine, erythromycin, ketoconazole, lamotrigine, mirtazapine, naltrexone, phenytoin, propranolol, rifampin, ritonavir, tramadol, trazodone, or zidovudine ¨ Birth control pills ¨ Diuretic (water pill) ¨ Medicine to treat depression ¨ Phenothiazine medicine ¨ Triptan medicine to treat migraine headaches · Do not drink alcohol while you are using this medicine. Acetaminophen can damage your liver, and alcohol can increase this risk. Do not take acetaminophen without asking your doctor if you have 3 or more drinks of alcohol every day. · Tell your doctor if you use anything else that makes you sleepy. Some examples are allergy medicine, narcotic pain medicine, and alcohol. Tell your doctor if you are using buprenorphine, butorphanol, nalbuphine, pentazocine, a benzodiazepine, or a muscle relaxer. Warnings While Using This Medicine: · Tell your doctor if you are pregnant or breastfeeding, or if you have kidney disease, liver disease, heart disease, low blood pressure, breathing problems or lung disease (such as asthma, COPD), thyroid problems, [place] disease, pancreas or gallbladder problems, prostate problems, trouble urinating, or a stomach problems, or a history of head injury or brain damage, seizures, or alcohol or drug abuse. Tell your doctor if you are allergic to codeine. · This medicine may cause the following problems: 
¨ High risk of overdose, which can lead to death ¨ Respiratory depression (serious breathing problem that can be life-threatening) ¨ Liver problems ¨ Serious skin reactions ¨ Serotonin syndrome (when used with certain medicines) · This medicine may make you dizzy or drowsy. Do not drive or do anything that could be dangerous until you know how this medicine affects you. Sit or lie down if you feel dizzy. Stand up carefully. · This medicine contains acetaminophen. Read the labels of all other medicines you are using to see if they also contain acetaminophen, or ask your doctor or pharmacist. Namrata Mai not use more than 4 grams (4,000 milligrams) total of acetaminophen in one day. · This medicine can be habit-forming. Do not use more than your prescribed dose. Call your doctor if you think your medicine is not working. · Do not stop using this medicine suddenly. Your doctor will need to slowly decrease your dose before you stop it completely. · This medicine could cause infertility. Talk with your doctor before using this medicine if you plan to have children. · This medicine may cause constipation, especially with long-term use. Ask your doctor if you should use a laxative to prevent and treat constipation. · Keep all medicine out of the reach of children. Never share your medicine with anyone. Possible Side Effects While Using This Medicine:  
Call your doctor right away if you notice any of these side effects: · Allergic reaction: Itching or hives, swelling in your face or hands, swelling or tingling in your mouth or throat, chest tightness, trouble breathing · Anxiety, restlessness, fast heartbeat, fever, muscle spasms, twitching, diarrhea, seeing or hearing things that are not there · Blistering, peeling, red skin rash · Blue lips, fingernails, or skin · Dark urine or pale stools, loss of appetite, stomach pain, yellow skin or eyes · Extreme weakness, shallow breathing, uneven heartbeat, seizures, sweating, or cold or clammy skin · Severe confusion, lightheadedness, dizziness, or fainting · Severe constipation, nausea, or vomiting · Trouble breathing or slow breathing If you notice these less serious side effects, talk with your doctor:  
· Headache · Mild constipation, nausea, or vomiting · Mild sleepiness or drowsiness If you notice other side effects that you think are caused by this medicine, tell your doctor. Call your doctor for medical advice about side effects. You may report side effects to FDA at 6-935-FDA-5738 © 2017 [address]  Information is for End User's use only and may not be sold, redistributed or otherwise used for commercial purposes. The above information is an  only. It is not intended as medical advice for individual conditions or treatments. Talk to your doctor, nurse or pharmacist before following any medical regimen to see if it is safe and effective for you. Dexamethasone (By mouth) Dexamethasone (dex-a-METH-a-sone) Treats symptoms of several diseases and conditions. This medicine is a corticosteroid. Brand Name(s): Cushings Syndrome Diagnostic, DexPak, DexPak 10 Day TaperPak, DexPak 13 Day TaperPak, DexPak 6 Day TaperPak, Dexamethasone Intensol, LoCort, ZonaCort There may be other brand names for this medicine. When This Medicine Should Not Be Used: This medicine is not right for everyone. Do not use it if you had an allergic reaction to dexamethasone, or if you have a fungal infection. How to Use This Medicine:  
Liquid, Tablet · Take your medicine as directed. Your dose may need to be changed several times to find what works best for you. · It is best to take this medicine with food or milk. · Oral liquid: Measure the oral liquid medicine with a marked measuring spoon, oral syringe, or medicine cup. · Missed dose: ¨ If your schedule is one dose every other day and you cannot use the missed dose until late in the day, wait until the next morning to use your medicine. Then skip a day and go back to your regular schedule. ¨ If your schedule is one dose every day, use the missed dose as soon as you can. Then go back to your regular schedule. ¨ If your schedule is more than one dose every day, use the missed dose as soon as you can. If it is almost time for your next dose, use two doses at that time. Then go back to your regular schedule. · Store the medicine in a closed container at room temperature, away from heat, moisture, and direct light. Drugs and Foods to Avoid: Ask your doctor or pharmacist before using any other medicine, including over-the-counter medicines, vitamins, and herbal products. · Some foods and medicines can affect how dexamethasone works. Tell your doctor if you are using any of the following: ¨ Aminoglutethimide, amphotericin B, carbamazepine, cholestyramine, cyclosporine, digoxin, indinavir, indomethacin, ketoconazole, phenobarbital, phenytoin, rifampin, or thalidomide ¨ Antibiotic (including azithromycin, clarithromycin, erythromycin) ¨ Birth control pills ¨ Blood thinner (including warfarin) ¨ Diuretic (water pill) ¨ Insulin and other diabetes medicine ¨ NSAID pain or arthritis medicine (including aspirin, celecoxib, diclofenac, naproxen) ¨ Potassium supplement · Do not drink alcohol while you are using this medicine. · This medicine may interfere with vaccines. Ask your doctor before you get a flu shot or any other vaccines. Warnings While Using This Medicine: · Tell your doctor if you are pregnant or breastfeeding, or if you have kidney problems, liver disease, diabetes, glaucoma, herpes infection of the eye, allergies, myasthenia gravis, osteoporosis, stomach or bowel problems, or thyroid problems. Tell your doctor if you have congestive heart failure, high blood pressure, or a recent heart attack. Tell your doctor if you have any type of infection or a history of depression or mental problems. · This medicine may cause the following problems: 
¨ High blood pressure, retaining water, changes in salt or potassium levels in your body ¨ Cataracts or glaucoma (with long-term use) ¨ Bone loss (with long-term use) ¨ Mood or behavior changes ¨ Slow growth in children (with long-term use) · Do not stop using this medicine suddenly. Your doctor will need to slowly decrease your dose before you stop it completely. · This medicine could cause you to get infections more easily. Tell your doctor right away if you are exposed to chicken pox, measles, or any other serious infection. Tell your doctor if you had a serious infection in the past, such as tuberculosis. · Tell your doctor about any extra stress or anxiety in your life. Your dose might need to be changed for a short time. · Make sure any doctor or dentist who treats you knows that you are using this medicine. · Your doctor will check your progress and the effects of this medicine at regular visits. Keep all appointments. · Keep all medicine out of the reach of children. Never share your medicine with anyone. Possible Side Effects While Using This Medicine:  
Call your doctor right away if you notice any of these side effects: · Allergic reaction: Itching or hives, swelling in your face or hands, swelling or tingling in your mouth or throat, chest tightness, trouble breathing · Changes in vision, trouble seeing, eye pain · Dark freckles, skin changes, coldness, weakness, tiredness, weight loss · Depression, unusual thoughts, feelings, or behaviors, trouble sleeping · Fast or slow, pounding heartbeat · Fever, chills, cough, sore throat, body aches · Rapid weight gain, swelling in your hands, ankles, or feet · Severe stomach pain, nausea, vomiting, or red or black stools · Trouble breathing · Trouble urinating · Worsened joint pain, swelling, or stiffness If you notice these less serious side effects, talk with your doctor: · Round, puffy face · Weight gain around your neck, upper back, breast, face, or waist 
If you notice other side effects that you think are caused by this medicine, tell your doctor. Call your doctor for medical advice about side effects. You may report side effects to FDA at 3-193-FDA-5026 © 2017 [address] Information is for End User's use only and may not be sold, redistributed or otherwise used for commercial purposes. The above information is an  only. It is not intended as medical advice for individual conditions or treatments. Talk to your doctor, nurse or pharmacist before following any medical regimen to see if it is safe and effective for you. Please provide this summary of care documentation to your next provider. Signatures-by signing, you are acknowledging that this After Visit Summary has been reviewed with you and you have received a copy. Patient Signature:  ____________________________________________________________ Date:  ____________________________________________________________  
  
Kasey Vázquez Provider Signature:  ____________________________________________________________ Date:  ____________________________________________________________

## 2018-02-13 NOTE — ANESTHESIA POSTPROCEDURE EVALUATION
Post-Anesthesia Evaluation and Assessment    Patient: Qamar Colón MRN: [id number]  SSN: [id number]    YOB: 1970  Age: 50 y.o. Sex: female       Cardiovascular Function/Vital Signs  Visit Vitals    /89    Pulse 69    Temp 36.2 °C (97.2 °F)    Resp 9    Ht 5' 2\" (1.575 m)    Wt 51.7 kg (114 lb)    SpO2 100%    BMI 20.85 kg/m2       Patient is status post general anesthesia for Procedure(s):  IMAGE GUIDED RIGHT FRONTAL CRANIOTOMY FOR TUMOR RESECTION. Nausea/Vomiting: None    Postoperative hydration reviewed and adequate. Pain:  Pain Scale 1: Numeric (0 - 10) (02/13/18 1434)  Pain Intensity 1: 0 (02/13/18 1434)   Managed    Neurological Status:   Neuro (WDL): Within Defined Limits (02/13/18 0834)  Neuro  Neurologic State: Drowsy (02/13/18 1434)  Orientation Level: Unable to verbalize (02/13/18 1434)  Cognition: Decreased attention/concentration;Decreased command following (02/13/18 1434)  Speech: Incomprehensible words (02/13/18 1434)  Assessment L Pupil: Round (02/13/18 1434)  Size L Pupil (mm): 2 (02/13/18 1434)  Assessment R Pupil: Round (02/13/18 1434)  Size R Pupil (mm): 2 (02/13/18 1434)  LUE Motor Response: Purposeful;Weak (02/13/18 1434)  LLE Motor Response: Purposeful;Weak (02/13/18 1434)  RUE Motor Response: Purposeful;Weak (02/13/18 1434)  RLE Motor Response: Purposeful;Weak (02/13/18 1434)   At baseline    Mental Status and Level of Consciousness: Alert and oriented     Pulmonary Status:   O2 Device: Oxygen mask (02/13/18 1426)   Adequate oxygenation and airway patent    Complications related to anesthesia: None    Post-anesthesia assessment completed.  No concerns    Signed By: Arleen Lynch CRNA     February 13, 2018

## 2018-02-13 NOTE — ROUTINE PROCESS
1430 - Patient arrived from OR. Assumed care of patient    1900- Bedside shift change report given to June Key 1723 (oncoming nurse) by Nemesio Smith RN (offgoing nurse). Report included the following information SBAR, MAR and Cardiac Rhythm NSR.

## 2018-02-13 NOTE — OP NOTES
[address] REPORT    Valentin Ba  MR#: [id number]  : 1970  ACCOUNT #: [de-identified]   DATE OF SERVICE: 2018    PREOPERATIVE DIAGNOSIS: Right frontal meningioma. POSTOPERATIVE  DIAGNOSIS: Right frontal meningioma. PROCEDURE PERFORMED  1. Right pterional craniotomy for resection of meningioma. 2.  Secondary dural grafting at the skull base with sutured graft, onlay graft and dural sealant. 3.  Cranioplasty skull reconstruction. 4.  Intraoperative microdissection. 5.  Intraoperative image guidance, surgical navigation with Stealth system. SURGEON:  Nicholas Ledesma MD    ASSISTANT: Renée Cornelius    ANESTHESIA: General orotracheal.      ESTIMATED BLOOD LOSS: 50 mL. COMPLICATIONS:  None. IMPLANTS:     BRIEF CLINICAL NOTE:  This is a 53-year-old lady with development of fairly acute headache syndrome. This has been progressive in the right frontal area. She was found to have a small right frontal meningioma. We talked about the option of surveillance versus resection and she requested to undergo the above procedure. PROCEDURE IN DETAIL:  After informed consent was given, the patient was brought to the operating room and underwent the induction of general orotracheal anesthesia without difficulty. Following this, she was carefully positioned supine upon the operating table with all pressure points carefully padded. The head was maintained in the Steele skull clamp, rotated to the left. The Stealth navigation system was used and the facial and scalp anatomy was registered with the [employer] work station to create an operative plan. A curvilinear hair shave technique was outlined on the right side, keeping it behind the hairline just to the superior temporal area around the superior temporal line anteriorly. A linear hair shave technique was carried out and prepped and draped in the usual sterile fashion. Local anesthetic was infiltrated. The incision was opened. The temporalis muscle was raised with the scalp flap. The sphenoid wing was radically removed. There was definitely some hyperostosis. This extensive bony removal would require a later cranioplasty and skull reconstruction. We drilled down flush to the skull base behind the orbit and extended it anteriorly so that we could reach the mapped out area of the tumor. Circumferential dural tack-up sutures were placed. The dura was cauterized and opened in a curvilinear fashion, reflected anteriorly. The tumor came into view along the frontal lobe and was seen to be indenting the brain. The microscope was brought into the case. Using a combination of suction, bipolar cautery and the CUSA, the tumor was progressively resected. It was scraped off the dura. Ultimately, I decided to resect the area of the dura from which the tumor had arisen. A complete resection was obtained. Frozen section was consistent with meningioma. Given the large dural defect, a secondary dural repair with a Medtronic Durepair was carried out. This was sutured circumferentially with a custom cut patch anteriorly where the dura was thin. Additional sutures were placed with small pieces of onlay graft incorporated into the suture. This was then later reinforced with DuraSeal.    Returning irrigation was clear. The bone flap was secured with the CranioFIX device. Cranioplasty skull reconstruction was then carried out by filling in the defect with a little bit of Gelfoam covering the area from bony surface to bony surface with Surgicel and then using [a]list games HydroSet bone cement. A good reconstructive result was obtained. The wound was then irrigated, closed in layers with vancomycin powder, 2-0 Vicryl and staples in the skin. A sterile dressing and standard head wrap were applied.   All sponge and needle counts were correct at the end of the case, the patient was extubated in the operating room and taken to the neurosurgical intensive care unit in stable condition.       Benn Sandhoff, MD Mardeen Courser / Filomena Keys  D: 02/13/2018 14:55     T: 02/13/2018 16:11  JOB #: [id number]

## 2018-02-13 NOTE — CONSULTS
Consults       Internal Medicine Consult          Consult Requested By: Dr. Ivan Combs, specialty    Subjective     HPI: Dion Jackson is a 50 y.o. female with hx of migraines and asthma along with small front meningioma who is due to undergo [address]. Patient has been having ongoing headaches and this is thought to be possibly etiology of headaches. We have been consulted for assistance in medical management. PMHx:  Past Medical History:   Diagnosis Date    Arthritis     Asthma     GERD (gastroesophageal reflux disease)     History of endometriosis 05/27/2008    History of herpes simplex infection     Hx of migraines     Meningioma (HCC)     Nausea & vomiting     Other ill-defined conditions(799.89)     low bp    Other ill-defined conditions(799.89)     mvp minor    Other ill-defined conditions(799.89)     IBS, diverticulosis       PSurgHx:  Past Surgical History:   Procedure Laterality Date    HX BREAST AUGMENTATION      breast implants    HX CHOLECYSTECTOMY      HX HERNIA REPAIR  12/01/2015    mesh    HX HYSTERECTOMY  05/27/2008    partial    HX SALPINGO-OOPHORECTOMY  07/27/2012    BSO, lysis of adhesions, cystoscopy       SocialHx:  Social History     Social History    Marital status:      Spouse name: N/A    Number of children: N/A    Years of education: N/A     Occupational History    Not on file.      Social History Main Topics    Smoking status: Never Smoker    Smokeless tobacco: Never Used    Alcohol use Yes      Comment: monthly    Drug use: No    Sexual activity: Yes     Partners: Male      Comment: hyst     Other Topics Concern    Not on file     Social History Narrative       FamilyHx:  Family History   Problem Relation Age of Onset   [de-identified] Stroke Mother     Hypertension Mother     Stroke Father     Diabetes Father     Hypertension Father     Malignant Hyperthermia Neg Hx     Pseudocholinesterase Deficiency Neg Hx  Delayed Awakening Neg Hx     Post-op Nausea/Vomiting Neg Hx     Emergence Delirium Neg Hx     Post-op Cognitive Dysfunction Neg Hx     Other Neg Hx        Prior to Admission Medications   Prescriptions Last Dose Informant Patient Reported? Taking? Bifidobacterium Infantis (ALIGN) 4 mg Cap 2/4/2018c  Yes Yes   Sig: Take 4 mg by mouth daily. Cetirizine 10 mg Cap 2/4/2018  Yes Yes   Sig: Take 10 mg by mouth daily. Cholecalciferol, Vitamin D3, (VITAMIN D3) 1,000 unit Cap 2/4/2018  Yes Yes   Sig: Take 5,000 Units by mouth daily. Xmfxmbyt-Bnbh-Tvqzee-Hyalur Ac 158-759-74-2 mg cap 2/4/2018  Yes Yes   Sig: Take 2 Tabs by mouth daily. VITAMIN E, DL,TOCOPHERYL ACET, (VITAMIN E ACETATE) 400 unit Cap capsule 2/4/2018  Yes Yes   Sig: Take 400 Units by mouth daily. ascorbic acid, vitamin C, (VITAMIN C) 500 mg tablet 2/4/2018  Yes Yes   Sig: Take 500 mg by mouth daily. calcium citrate 200 mg (950 mg) tablet 2/4/2018  Yes Yes   Sig: Take 630 mg by mouth daily. cyanocobalamin 1,000 mcg tablet 2/4/2018  Yes Yes   Sig: Take 1,000 mcg by mouth daily. multivitamin (ONE A DAY) tablet 2/4/2018  Yes Yes   Sig: Take 1 Tab by mouth daily. omeprazole (PRILOSEC OTC) 20 mg tablet 2/13/2018 at Unknown time  Yes Yes   Sig: Take 20 mg by mouth daily. topiramate (TOPAMAX) 50 mg tablet 2/12/2018 at Unknown time  Yes Yes   Sig: TK 1 T PO QPM      Facility-Administered Medications: None       Review of Systems:  Constitutional:  Denies fever, chills or weight loss  Eyes: Denies vision loss or changes, denies pain  Ears, Nose, Mouth, Throat: Denies hearing loss, denies sore throat  Cardiovascular:  Denies chest pain or diaphoresis  Respiratory:  Denies coughing, wheezing, or shortness of breath. Gastrointestinal:  Denies nausea or vomitting.   Denies diarrhea or constipation  Genitourinary:  Denies hematuria or dysuria  Musculoskeletal: Denies back pain or muscle/joint pain  Skin:  Denies rashes or moles  Neuro:  Denies seizures, loss of sensation or motor function or syncope      Objective      Visit Vitals    /77 (BP 1 Location: Left arm, BP Patient Position: At rest)    Pulse 85    Temp 98.4 °F (36.9 °C)    Resp 16    Ht 5' 2\" (1.575 m)    Wt 51.7 kg (114 lb)    SpO2 100%    BMI 20.85 kg/m2       Physical Exam:  General Appearance: NAD, conversant  HENT: normocephalic/atraumatic, moist mucus membranes  Lungs: CTA with normal respiratory effort  CV: RRR, no m/r/g  Abdomen: soft, non-tender, normal bowel sounds  Extremities: no cyanosis, no peripheral edema  Neuro: moves all extremities, no focal deficits  Psych: appropriate affect, alert and oriented to person, place and time      Imaging Reviewed:  No results found. Assessment/Plan     There are no active hospital problems to display for this patient. Small Front Meningioma  -due to undergo IMAGE GUIDED RIGHT FRONTAL CRANIOTOMY FOR TUMOR RESECTION  -defer management to Primary    Asthma  -stable  -prn nebs    Headaches  -Topamax    DVT prophylaxis  -scd/teds      - Cont acceptable home medications for chronic conditions   - DVT protocol    I reviewed all available labs and imaging that were available prior to my encounter. We appreciate the consultation for medical management and appreciate being able to be involved with their care during hospitalization.       Sarabjit Leonard, ACNP-BC  [id number] [facility]

## 2018-02-13 NOTE — ANESTHESIA PREPROCEDURE EVALUATION
Anesthetic History     PONV          Review of Systems / Medical History  Patient summary reviewed and pertinent labs reviewed    Pulmonary            Asthma : well controlled       Neuro/Psych              Cardiovascular  Within defined limits                Exercise tolerance: >4 METS     GI/Hepatic/Renal     GERD           Endo/Other        Arthritis     Other Findings            Physical Exam    Airway  Mallampati: III  TM Distance: 4 - 6 cm  Neck ROM: normal range of motion   Mouth opening: Diminished (comment)     Cardiovascular    Rhythm: regular  Rate: normal         Dental         Pulmonary  Breath sounds clear to auscultation               Abdominal  GI exam deferred       Other Findings            Anesthetic Plan    ASA: 3  Anesthesia type: general          Induction: Intravenous  Anesthetic plan and risks discussed with: Patient

## 2018-02-13 NOTE — PROGRESS NOTES
1930  Bedside shift change report given to chad cota rn (oncoming nurse) by Smiley Larios (offgoing nurse). Report included the following information SBAR, Kardex and Recent Results side rails up x 3. Hob elevated 30 degrees. Call light within reach . 1 dr Mcneal Sayres neurosurgery paged concerning vanc itching. thru answering service. 1940 spoke with mago wang concerning vanc itching. 2000 assessment completed , stone and oral care completed. No numbness  ot facial weakness noted. 2038 dr Anderson Benítez notified pt received Jolena Stallion intra-op 0845 and dose vanc stopped 0944 due to itching, and reordered for 0200. Orders received to infuse over 4 hours not 2 hour, mago pharmist notified. 2100 resting in bed quietly, easiy awaken for neuro assessment. 2200 pulled up ad turned to right side slightly, c/o being nausea  2203 zofran 4 mg for nausea. 2330 c/o headache right side head aching. 2215 pt states nausea relieved given sips ginger-jim., tolerated well.hob remains elevated 30 degrees. 2300 pt states pain is at acceptable level , when asked. 0000 reassessment completed. Stone and oral care provided. Encouraged to take deep breaths with temp 99.4. Lungs remain clear . 0115 instructed in use incentive spirometer, can use in 750-1259 range and keep between arrows on hand. Tolerated fair. 0220 explained to pt she is receiving antibiotic vancomycin , and if she begins to itch to call nurse asap if that happens. 0300 pt resting, denies itcing at current time and states I am tired and pain is ok for now. 0400 reassessment completed. Oral and stone care provided. 4601 pt resting, offered bed bath states she would like to wait. 0730 Bedside shift change report given to dom brown (oncoming nurse) by chad brown (offgoing nurse). Report included the following information SBAR, Kardex and Recent Results. Hob elevated 30 degrees. Side rails up x 3, call light within reach. Pt resting quietly, states zhu is okay for now.

## 2018-02-13 NOTE — H&P
History and Physical reviewed; I have examined the patient and there are no pertinent changes.     Tamera Chan MD   10:24 AM 2/13/2018

## 2018-02-14 LAB
EST. AVERAGE GLUCOSE BLD GHB EST-MCNC: 111 MG/DL
GLUCOSE BLD STRIP.AUTO-MCNC: 161 MG/DL (ref 70–110)
GLUCOSE BLD STRIP.AUTO-MCNC: 169 MG/DL (ref 70–110)
GLUCOSE BLD STRIP.AUTO-MCNC: 200 MG/DL (ref 70–110)
HBA1C MFR BLD: 5.5 % (ref 4.2–5.6)

## 2018-02-14 PROCEDURE — 74011000250 HC RX REV CODE- 250: Performed by: NEUROLOGICAL SURGERY

## 2018-02-14 PROCEDURE — 74011636637 HC RX REV CODE- 636/637: Performed by: NEUROLOGICAL SURGERY

## 2018-02-14 PROCEDURE — 74011636637 HC RX REV CODE- 636/637: Performed by: FAMILY MEDICINE

## 2018-02-14 PROCEDURE — 77030027138 HC INCENT SPIROMETER -A

## 2018-02-14 PROCEDURE — 82962 GLUCOSE BLOOD TEST: CPT

## 2018-02-14 PROCEDURE — 97116 GAIT TRAINING THERAPY: CPT

## 2018-02-14 PROCEDURE — 97162 PT EVAL MOD COMPLEX 30 MIN: CPT

## 2018-02-14 PROCEDURE — 74011250637 HC RX REV CODE- 250/637: Performed by: NURSE PRACTITIONER

## 2018-02-14 PROCEDURE — 74011250636 HC RX REV CODE- 250/636: Performed by: NURSE PRACTITIONER

## 2018-02-14 PROCEDURE — 65270000029 HC RM PRIVATE

## 2018-02-14 PROCEDURE — 74011250637 HC RX REV CODE- 250/637: Performed by: NEUROLOGICAL SURGERY

## 2018-02-14 PROCEDURE — 74011250636 HC RX REV CODE- 250/636: Performed by: NEUROLOGICAL SURGERY

## 2018-02-14 RX ORDER — MAGNESIUM SULFATE 100 %
4 CRYSTALS MISCELLANEOUS AS NEEDED
Status: DISCONTINUED | OUTPATIENT
Start: 2018-02-14 | End: 2018-02-14 | Stop reason: SDUPTHER

## 2018-02-14 RX ORDER — DEXTROSE 50 % IN WATER (D50W) INTRAVENOUS SYRINGE
25-50 AS NEEDED
Status: DISCONTINUED | OUTPATIENT
Start: 2018-02-14 | End: 2018-02-15 | Stop reason: HOSPADM

## 2018-02-14 RX ORDER — INSULIN LISPRO 100 [IU]/ML
INJECTION, SOLUTION INTRAVENOUS; SUBCUTANEOUS
Status: DISCONTINUED | OUTPATIENT
Start: 2018-02-15 | End: 2018-02-15 | Stop reason: HOSPADM

## 2018-02-14 RX ORDER — MAGNESIUM SULFATE 100 %
4 CRYSTALS MISCELLANEOUS AS NEEDED
Status: DISCONTINUED | OUTPATIENT
Start: 2018-02-14 | End: 2018-02-15 | Stop reason: HOSPADM

## 2018-02-14 RX ORDER — INSULIN LISPRO 100 [IU]/ML
INJECTION, SOLUTION INTRAVENOUS; SUBCUTANEOUS
Status: DISCONTINUED | OUTPATIENT
Start: 2018-02-14 | End: 2018-02-14 | Stop reason: SDUPTHER

## 2018-02-14 RX ADMIN — MORPHINE SULFATE 3 MG: 2 INJECTION, SOLUTION INTRAMUSCULAR; INTRAVENOUS at 04:09

## 2018-02-14 RX ADMIN — OXYCODONE HYDROCHLORIDE AND ACETAMINOPHEN 2 TABLET: 5; 325 TABLET ORAL at 18:04

## 2018-02-14 RX ADMIN — DEXAMETHASONE SODIUM PHOSPHATE 4 MG: 4 INJECTION, SOLUTION INTRAMUSCULAR; INTRAVENOUS at 15:30

## 2018-02-14 RX ADMIN — SODIUM CHLORIDE 1000 MG: 900 INJECTION, SOLUTION INTRAVENOUS at 21:12

## 2018-02-14 RX ADMIN — SODIUM CHLORIDE AND POTASSIUM CHLORIDE: 9; 1.49 INJECTION, SOLUTION INTRAVENOUS at 05:03

## 2018-02-14 RX ADMIN — TOPIRAMATE 50 MG: 25 TABLET, FILM COATED ORAL at 21:17

## 2018-02-14 RX ADMIN — FAMOTIDINE 20 MG: 10 INJECTION, SOLUTION INTRAVENOUS at 09:16

## 2018-02-14 RX ADMIN — BISACODYL 10 MG: 5 TABLET, COATED ORAL at 18:29

## 2018-02-14 RX ADMIN — INSULIN LISPRO 4 UNITS: 100 INJECTION, SOLUTION INTRAVENOUS; SUBCUTANEOUS at 11:50

## 2018-02-14 RX ADMIN — ONDANSETRON 4 MG: 2 INJECTION INTRAMUSCULAR; INTRAVENOUS at 09:17

## 2018-02-14 RX ADMIN — MORPHINE SULFATE 3 MG: 2 INJECTION, SOLUTION INTRAMUSCULAR; INTRAVENOUS at 00:12

## 2018-02-14 RX ADMIN — MORPHINE SULFATE 2 MG: 2 INJECTION, SOLUTION INTRAMUSCULAR; INTRAVENOUS at 06:11

## 2018-02-14 RX ADMIN — DEXAMETHASONE SODIUM PHOSPHATE 4 MG: 4 INJECTION, SOLUTION INTRAMUSCULAR; INTRAVENOUS at 09:16

## 2018-02-14 RX ADMIN — DEXAMETHASONE SODIUM PHOSPHATE 4 MG: 4 INJECTION, SOLUTION INTRAMUSCULAR; INTRAVENOUS at 21:13

## 2018-02-14 RX ADMIN — SODIUM CHLORIDE 1000 MG: 900 INJECTION, SOLUTION INTRAVENOUS at 02:15

## 2018-02-14 RX ADMIN — OXYCODONE HYDROCHLORIDE AND ACETAMINOPHEN 2 TABLET: 5; 325 TABLET ORAL at 13:06

## 2018-02-14 RX ADMIN — DEXAMETHASONE SODIUM PHOSPHATE 4 MG: 4 INJECTION, SOLUTION INTRAMUSCULAR; INTRAVENOUS at 02:55

## 2018-02-14 RX ADMIN — FAMOTIDINE 20 MG: 10 INJECTION, SOLUTION INTRAVENOUS at 23:31

## 2018-02-14 RX ADMIN — OXYCODONE HYDROCHLORIDE AND ACETAMINOPHEN 1 TABLET: 5; 325 TABLET ORAL at 09:17

## 2018-02-14 RX ADMIN — SODIUM CHLORIDE AND POTASSIUM CHLORIDE: 9; 1.49 INJECTION, SOLUTION INTRAVENOUS at 18:26

## 2018-02-14 RX ADMIN — INSULIN LISPRO 2 UNITS: 100 INJECTION, SOLUTION INTRAVENOUS; SUBCUTANEOUS at 23:30

## 2018-02-14 RX ADMIN — INSULIN LISPRO 2 UNITS: 100 INJECTION, SOLUTION INTRAVENOUS; SUBCUTANEOUS at 17:04

## 2018-02-14 NOTE — PROGRESS NOTES
Progress Note      Patient: Mir Samuel               Sex: female          DOA: 2/13/2018         YOB: 1970      Age:  50 y.o.        LOS:  LOS: 1 day                  Procedure(s):  IMAGE GUIDED RIGHT FRONTAL CRANIOTOMY FOR TUMOR RESECTION  1. Right pterional craniotomy for resection of meningioma. 2.  Secondary dural grafting at the skull base with sutured graft, onlay graft and dural sealant. 3.  Cranioplasty skull reconstruction. 4.  Intraoperative microdissection. 5.  Intraoperative image guidance, surgical navigation with Stealth system. SURGERY DATE: 2/13/2018               Dx: BRAIN TUMOR  Brain tumor [facility])        Past Medical History:   Diagnosis Date    Arthritis     Asthma     GERD (gastroesophageal reflux disease)     History of endometriosis 05/27/2008    History of herpes simplex infection     Hx of migraines     Meningioma (HCC)     Nausea & vomiting     Other ill-defined conditions(799.89)     low bp    Other ill-defined conditions(799.89)     mvp minor    Other ill-defined conditions(799.89)     IBS, diverticulosis       Past Surgical History:   Procedure Laterality Date    HX BREAST AUGMENTATION      breast implants    HX CHOLECYSTECTOMY      HX HERNIA REPAIR  12/01/2015    mesh    HX HYSTERECTOMY  05/27/2008    partial    HX SALPINGO-OOPHORECTOMY  07/27/2012    BSO, lysis of adhesions, cystoscopy       POD# 1  SUBJECTIVE:  Some expected headache. Denies visual changes or double vision. Denies nausea.  + Flatus.       OBJECTIVE:  Patient Vitals for the past 24 hrs:   Temp Pulse Resp BP SpO2   02/14/18 1000 - 63 10 96/54 96 %   02/14/18 0905 - 66 13 104/56 96 %   02/14/18 0900 - 66 13 95/49 96 %   02/14/18 0800 99.7 °F (37.6 °C) 61 12 97/50 96 %   02/14/18 0701 - 61 11 100/46 96 %   02/14/18 0700 - 62 11 93/49 96 %   02/14/18 0600 - 61 12 99/51 96 %   02/14/18 0500 - 76 13 103/59 98 %   02/14/18 0400 99.8 °F (37.7 °C) 62 12 106/57 96 % 02/14/18 0300 - 67 14 104/57 96 %   02/14/18 0200 - 70 13 104/53 96 %   02/14/18 0100 - 64 12 100/55 96 %   02/14/18 0001 99.4 °F (37.4 °C) 64 12 108/59 97 %   02/13/18 2300 - 63 13 108/61 97 %   02/13/18 2200 - 74 13 118/65 98 %   02/13/18 2100 - 66 13 102/55 98 %   02/13/18 2000 98.4 °F (36.9 °C) 74 13 105/66 97 %   02/13/18 1900 - 74 15 103/58 97 %   02/13/18 1800 - 75 12 101/58 96 %   02/13/18 1700 - 70 12 119/63 95 %   02/13/18 1630 - 67 11 102/61 96 %   02/13/18 1600 - 76 13 105/57 95 %   02/13/18 1545 - 84 11 117/59 95 %   02/13/18 1530 - 91 13 107/56 96 %   02/13/18 1515 - 89 9 104/61 96 %   02/13/18 1500 - 80 11 100/58 95 %   02/13/18 1455 - 87 11 110/55 96 %   02/13/18 1450 - 72 10 105/62 98 %   02/13/18 1445 - 86 15 103/56 100 %   02/13/18 1440 - 69 11 109/57 100 %   02/13/18 1435 - 74 11 102/57 100 %   02/13/18 1434 97.2 °F (36.2 °C) 69 9 100/89 100 %   02/13/18 1430 - 83 11 100/89 100 %   02/13/18 1426 98.6 °F (37 °C) 78 12 120/73 100 %           Vent         Intake and Output    Intake/Output Summary (Last 24 hours) at 02/14/18 1025  Last data filed at 02/14/18 1000   Gross per 24 hour   Intake          3103.75 ml   Output             1969 ml   Net          1134.75 ml         Data    Recent Results (from the past 24 hour(s))   URINALYSIS W/ RFLX MICROSCOPIC    Collection Time: 02/13/18  6:30 PM   Result Value Ref Range    Color YELLOW      Appearance CLEAR      Specific gravity 1.023 1.005 - 1.030      pH (UA) 5.5 5.0 - 8.0      Protein NEGATIVE  NEG mg/dL    Glucose NEGATIVE  NEG mg/dL    Ketone 15 (A) NEG mg/dL    Bilirubin NEGATIVE  NEG      Blood NEGATIVE  NEG      Urobilinogen 0.2 0.2 - 1.0 EU/dL    Nitrites NEGATIVE  NEG      Leukocyte Esterase NEGATIVE  NEG     METABOLIC PANEL, BASIC    Collection Time: 02/13/18  6:48 PM   Result Value Ref Range    Sodium 141 136 - 145 mmol/L    Potassium 4.3 3.5 - 5.5 mmol/L    Chloride 105 100 - 108 mmol/L    CO2 24 21 - 32 mmol/L    Anion gap 12 3.0 - 18 mmol/L    Glucose 188 (H) 74 - 99 mg/dL    BUN 17 7.0 - 18 MG/DL    Creatinine 0.87 0.6 - 1.3 MG/DL    BUN/Creatinine ratio 20 12 - 20      GFR est AA >60 >60 ml/min/1.73m2    GFR est non-AA >60 >60 ml/min/1.73m2    Calcium 8.8 8.5 - 10.1 MG/DL        Current Facility-Administered Medications   Medication Dose Route Frequency    topiramate (TOPAMAX) tablet 50 mg  50 mg Oral DAILY    ondansetron (ZOFRAN) injection 4 mg  4 mg IntraVENous Q4H PRN    dexamethasone (DECADRON) 4 mg/mL injection 4 mg  4 mg IntraVENous Q6H    [START ON 2/16/2018] dexamethasone (DECADRON) 4 mg/mL injection 4 mg  4 mg IntraVENous Q8H    [START ON 2/17/2018] dexamethasone (DECADRON) 4 mg/mL injection 2 mg  2 mg IntraVENous Q6H    [START ON 2/18/2018] dexamethasone (DECADRON) 4 mg/mL injection 2 mg  2 mg IntraVENous Q8H    [START ON 2/19/2018] dexamethasone (DECADRON) 4 mg/mL injection 2 mg  2 mg IntraVENous Q12H    bisacodyl (DULCOLAX) tablet 10 mg  10 mg Oral DAILY PRN    famotidine (PF) (PEPCID) 20 mg in sodium chloride 0.9 % 10 mL injection  20 mg IntraVENous Q12H    labetalol (NORMODYNE;TRANDATE) injection 10-20 mg  10-20 mg IntraVENous Q30MIN PRN    metoprolol (LOPRESSOR) injection 2.5 mg  2.5 mg IntraVENous Q6H PRN    labetalol (NORMODYNE;TRANDATE) 200 mg in 0.9% sodium chloride 200 mL infusion  0.5-2 mg/min IntraVENous TITRATE    0.9% sodium chloride with KCl 20 mEq/L infusion   IntraVENous CONTINUOUS    acetaminophen (TYLENOL) tablet 650 mg  650 mg Oral Q6H PRN    oxyCODONE-acetaminophen (PERCOCET) 5-325 mg per tablet 1-2 Tab  1-2 Tab Oral Q4H PRN    morphine injection 2-4 mg  2-4 mg IntraVENous Q1H PRN    vancomycin (VANCOCIN) 1,000 mg in 0.9% sodium chloride (MBP/ADV) 250 mL  1,000 mg IntraVENous Q18H    [START ON 2/15/2018] VANCOMYCIN INFORMATION NOTE   Other ONCE    VANCOMYCIN INFORMATION NOTE   Other Rx Dosing/Monitoring       Physical Exam  General:  Neurologic: Alert and oriented X 3  Sensory: normal  Eyes: conjunctivae/corneas clear. PERRL, EOM's intact. Visual fields were intact to confrontation  Eye movements were full and conjugate, saccades were accurate, pursuit movements were smooth, and there was no nystagmus. The palate and tongue moved in the midline. Motor: 5/5 throughout  Skin: Dressing to crani with small amount of dried sang drainage to right occiput. Some ecchymosis and edema to right millie orbital.      Assessment/Plan    50y.o. year old female who is hospital day 1 for <principal problem not specified>.     1.)  Neurologic:  Doing well. PRN analgesics for headache. OOB today with PT. May use cool cloth to right eye. Standard steroid taper in place. Transfer to floor today. 2.)  Cardiovascular:  Keep SBP < 140.      3.)  Pulmonary:  Pulmonary toilet. 4.)  Renal:  HERNANDO stone.             Assessment and plan made with Dr. Radha Rai NP  2/14/2018  10:25 AM

## 2018-02-14 NOTE — PROGRESS NOTES
1100: Requested a code status be placed on file. Reginold Case, NP agreed to do so. Nata Aziza also verbally ordered that the patient be switched to neuro checks every four hours. Orders placed and followed. Also updated NP that patient's BP has been marginal. SBP > 90 but MAP 61. Patient runs low at home. UOP adequate. Mentation clear and patient alert. No new orders, just advised to be careful with continued pain medication administration. No IV narcotics given on this shift. One tab PO percocet given at 1873.     1110: Attempted to call report on patient to [facility]. 1130: Rosa catheter removed per order w/ no incident. 1210: Scopalamine patch removed from behind left ear at patient's request with approval from Dr. Liss Machado. 1220: TRANSFER - OUT REPORT:    Verbal report given to Sommergianfranco Winn (name) on [facility]  being transferred to [facility] (unit) for routine progression of care       Report consisted of patients Situation, Background, Assessment and   Recommendations(SBAR). Information from the following report(s) SBAR, Kardex, OR Summary, Procedure Summary, Intake/Output, MAR, Recent Results and Cardiac Rhythm NSR was reviewed with the receiving nurse. Lines:   Peripheral IV 02/13/18 Left Forearm (Active)   Site Assessment Clean, dry, & intact 2/14/2018  8:00 AM   Phlebitis Assessment 0 2/14/2018  8:00 AM   Infiltration Assessment 0 2/14/2018  8:00 AM   Dressing Status Clean, dry, & intact 2/14/2018  8:00 AM   Dressing Type Transparent;Tape 2/14/2018  8:00 AM   Hub Color/Line Status Pink; Infusing 2/14/2018  8:00 AM   Action Taken Open ports on tubing capped 2/14/2018  8:00 AM   Alcohol Cap Used Yes 2/14/2018  8:00 AM        Opportunity for questions and clarification was provided. Patient transported with:   Registered Nurse    1400: Patient transferred to room [id number]. Shaq at bedside.

## 2018-02-14 NOTE — PROGRESS NOTES
conducted an initial consultation and Spiritual Assessment for Raymon Trinidad, who is a 50 y.o.,female. Patients Primary Language is: [place]. According to the patients EMR Yazidism Affiliation is: [facility].     The reason the Patient came to the hospital is:   Patient Active Problem List    Diagnosis Date Noted    Brain tumor [facility]) 02/13/2018    Asthma     GERD (gastroesophageal reflux disease)     Arthritis     History of herpes simplex infection     Meningioma ([address]     Hx of migraines     Elevated LFTs 08/03/2016    History of endometriosis 05/27/2008        The  provided the following Interventions:  Initiated a relationship of care and support with patient in room 2224 this afternoon. Listened empathically as patient related her story of surgery and how she is feeling at present. Family members were present in the room. Provided information about Spiritual Care Services. Offered prayer and assurance of continued prayers on patients behalf. The following outcomes were achieved:  Patient shared limited information about her medical narrative and spiritual journey/beliefs. Patient processed feeling about current hospitalization. Patient expressed gratitude for pastoral care visit. Assessment:  Patient does not have any Episcopal/cultural needs that will affect patients preferences in health care. There are no further spiritual or Episcopal issues which require Spiritual Care Services interventions at this time. Plan:  Chaplains will continue to follow and will provide pastoral care on an as needed/requested basis    . [facility]ie [facility]   Spiritual Care   [phone]

## 2018-02-14 NOTE — ACP (ADVANCE CARE PLANNING)
Patient has designated ____spouse____________________ to participate in his/her discharge plan and to receive any needed information.      Name: Ingrid Dasilva   Address: same as pt  Phone [phone]

## 2018-02-14 NOTE — ROUTINE PROCESS
TRANSFER - IN REPORT:    Verbal report received from [address] [place] [address],[address] on [facility]  being received from ICU(unit) for routine progression of care      Report consisted of patients Situation, Background, Assessment and   Recommendations(SBAR). Information from the following report(s) SBAR, Kardex, Procedure Summary, Intake/Output and MAR was reviewed with the receiving nurse. Opportunity for questions and clarification was provided. Assessment completed upon patients arrival to unit and care assumed.

## 2018-02-14 NOTE — PROGRESS NOTES
Problem: Mobility Impaired (Adult and Pediatric)  Goal: *Acute Goals and Plan of Care (Insert Text)  PHYSICAL THERAPY SHORT TERM GOALS :   1.  Patient will perform/complete all bed mobility with modified independence within 1 week(s). 2.  Patient will perform/complete sit <> stand with modified independence within 1 week(s). 3.  Patient will ambulate 350 with modified independence within 1 week(s). 4.  Patient will ascend/descend 20 stairs with SHR  with supervision/set-up within 1 week(s). Therapist Erika Crane  2/14/2018   Time Calculation: 25 mins  Outcome: Progressing Towards Goal  physical Therapy EVALUATION    Patient: Magda Bryant (12 y.o. female)  Date: 2/14/2018  Primary Diagnosis: BRAIN TUMOR  Brain tumor ([address]  Procedure(s) (LRB):  IMAGE GUIDED RIGHT FRONTAL CRANIOTOMY FOR TUMOR RESECTION (Right) 1 Day Post-Op   Precautions:   Fall   PLOF: I with ADLs and ambulation     ASSESSMENT :  Patient supine in bed, agreeable to participation with PT.  and daughter present. Patient able to transfer supine <> sit with CGA; transfers sit <> stand with CGA. Demonstrates B UE and LE strength 4+/5. Ambulates ~350 ft without AD and with CGA; gait speed decreased with step to gait pattern. Patient also demonstrates slight posterior lean with gait. Patient able to ascend/descend 5 stairs with HR use and CGA. Ascends with reciprocal pattern, descends with step to pattern. Patient returned to bed and positioned for comfort. Patient reports head pain which she rates 4/10 pre and post treatment; explains that her pain is different from her original HA she had prior to surgery. Patient also inquiring about her Topamax prescription; nursing notified. Patient educated on safety with functional mobility and PT plan of care. Patient and family verbalized and demonstrated understanding.    Patient presents with deficits in:   Bed Mobility, Transfers, Gait, Strength and Stairs    Patient will benefit from skilled intervention to address the above impairments. Patients rehabilitation potential is considered to be Good  Factors which may influence rehabilitation potential include:   []         None noted  []         Mental ability/status  [x]         Medical condition  []         Home/family situation and support systems  []         Safety awareness  [x]         Pain tolerance/management  []         Other:        PLAN :  Recommendations and Planned Interventions:  [x]           Bed Mobility Training             [x]    Neuromuscular Re-Education  [x]           Transfer Training                   []    Orthotic/Prosthetic Training  [x]           Gait Training                          []    Modalities  [x]           Therapeutic Exercises          []    Edema Management/Control  [x]           Therapeutic Activities            [x]    Patient and Family Training/Education  []           Other (comment):    Frequency/Duration: Patient will be followed by physical therapy 1-2 times per day/4-7 days per week to address goals. Discharge Recommendations: None  Further Equipment Recommendations for Discharge: N/A     SUBJECTIVE:   Patient stated My headache now is from surgery and moving around, not like my previous headaches.     OBJECTIVE DATA SUMMARY:     Past Medical History:   Diagnosis Date    Arthritis     Asthma     GERD (gastroesophageal reflux disease)     History of endometriosis 05/27/2008    History of herpes simplex infection     Hx of migraines     Meningioma (HCC)     Nausea & vomiting     Other ill-defined conditions(799.89)     low bp    Other ill-defined conditions(799.89)     mvp minor    Other ill-defined conditions(799.89)     IBS, diverticulosis     Past Surgical History:   Procedure Laterality Date    HX BREAST AUGMENTATION      breast implants    HX CHOLECYSTECTOMY      HX HERNIA REPAIR  12/01/2015    mesh    HX HYSTERECTOMY  05/27/2008    partial    HX SALPINGO-OOPHORECTOMY 07/27/2012    BSO, lysis of adhesions, cystoscopy     Barriers to Learning/Limitations: yes;  none  Compensate with: visual, verbal, tactile, kinesthetic cues/model    G CODE:Mobility  Current  CJ= 20-39%   Goal  CI= 1-19%. The severity rating is based on the [employer] Balance Scale 4/5    Eval Complexity: History: MEDIUM  Complexity : 1-2 comorbidities / personal factors will impact the outcome/ POC Exam:HIGH Complexity : 4+ Standardized tests and measures addressing body structure, function, activity limitation and / or participation in recreation  Presentation: MEDIUM Complexity : Evolving with changing characteristics  Clinical Decision Making:Medium Complexity [facility] Standing Balance Scale 4/5 Overall Complexity:[facility] Standing Balance Scale 4/5  0: Pt performs 25% or less of standing activity (Max assist) CN, 100% impaired. 1: Pt supports self with upper extremities but requires therapist assistance. Pt performs 25-50% of effort (Mod assist) CM, 80% to <100% impaired. 1+: Pt supports self with upper extremities but requires therapist assistance. Pt performs >50% effort. (Min assist). CL, 60% to <80% impaired. 2: Pt supports self independently with both upper extremities (walker, crutches, parallel bars). CL, 60% to <80% impaired. 2+: Pt support self independently with 1 upper extremity (cane, crutch, 1 parallel bar). CK, 40% to <60% impaired. 3: Pt stands without upper extremity support for up to 30 seconds. CK, 40% to <60% impaired. 3+: Pt stands without upper extremity support for 30 seconds or greater. CJ, 20% to <40% impaired. 4: Pt independently moves and returns center of gravity 1-2 inches in one plane. CJ, 20% to <40% impaired. 4+: Pt independently moves and returns center of gravity 1-2 inches in multiple planes. CI, 1% to <20% impaired.   5: Pt independently moves and returns center of gravity in all planes greater than 2 inches. CH, 0% impaired. Prior Level of Function/Home Situation:   Home Situation  Home Environment: Private residence  One/Two Story Residence: Two story  # of Interior Steps: 21  Interior Rails: Both  Living Alone: No  Support Systems: Child(stefani), Skilled nursing facility  Patient Expects to be Discharged to[de-identified] Private residence  Current DME Used/Available at Home: Ephriam Koyanagi, rolling, Cane, straight  Tub or Shower Type: Shower  Critical Behavior:  Neurologic State: Alert  Orientation Level: Oriented X4  Cognition: Appropriate safety awareness     Psychosocial  Patient Behaviors: Calm; Cooperative  Family  Behaviors: Calm; Cooperative;Supportive  Needs Expressed: Educational  Purposeful Interaction: Yes  Strength:    Strength: Within functional limits; B UE and LE 4+/5   Tone & Sensation:   Tone: Normal  Sensation: Intact  Range Of Motion:  AROM: Within functional limits  Functional Mobility:  Bed Mobility:  Supine to Sit: Contact guard assistance   Sit to Supine: Contact guard assistance  Transfers:  Sit to Stand: Contact guard assistance  Stand to Sit: Contact guard assistance  Balance:   Sitting: Intact  Standing: Intact  Ambulation/Gait Training:  Distance (ft): 350 Feet (ft)  Ambulation - Level of Assistance: Contact guard assistance  Gait Description (WDL): Exceptions to WDL  Gait Abnormalities: Step to gait (Minimal posterior trunk lean with gait )  Right Side Weight Bearing: Full  Left Side Weight Bearing: Full  Base of Support: Center of gravity altered  Speed/Oanh: Pace decreased (<100 feet/min)  Step Length: Right shortened;Left shortened    Pain:  Head, proximal to surgical site   Pre treatment pain level:4  Post treatment pain level:4  Activity Tolerance:   Fair   Please refer to the flowsheet for vital signs taken during this treatment.   After treatment:   []         Patient left in no apparent distress sitting up in chair  [x]         Patient left in no apparent distress in bed  [x]         Call bell left within reach  [x]         Nursing notified  []         Caregiver present  []         Bed alarm activated    COMMUNICATION/EDUCATION: Verbalize and demonstrated understanding. [x]         Fall prevention education was provided and the patient/caregiver indicated understanding. [x]         Patient/family have participated as able in goal setting and plan of care. [x]         Patient/family agree to work toward stated goals and plan of care. []         Patient understands intent and goals of therapy, but is neutral about his/her participation. []         Patient is unable to participate in goal setting and plan of care.     Thank you for this referral.  Marita Lamb   Time Calculation: 25 mins

## 2018-02-14 NOTE — MANAGEMENT PLAN
[facility]   Discharge Planning/ Assessment    Reasons for Intervention:   Interviewed patient. Verified demographics listed on face sheet with patient; all information correct. Pt has [employer] for insurance. Patient stated their PCP is Dr Sunny Zuniga and she states last appt fall of 2016 . Patient lives in single family, 2 story house with spouse. Patient's NOK is , Dedra Standing at [phone]. Patient independent with ADLs prior to admission. No use of DME prior to admission. Discharge plan is Home.  Care Management following for any needs  Mukesh Covarrubias RN BSN  Outcomes Manager  Pager # [phone]    High Risk Criteria  [] Yes  [x]No   Physician Referral  [] Yes  []No        Date    Nursing Referral  [] Yes  []No        Date    Patient/Family Request  [] Yes  []No        Date       Resources:    Medicare  [] Yes  []No   Medicaid  [] Yes  []No   No Resources  [] Yes  []No   Private Insurance  [x] Yes  []No    Name/Phone Number    Other  [] Yes  []No        (i.e. Workman's Comp)         Prior Services:    Prior Services  [] Yes  [x]No   Home Health  [] Yes  []No   [address]  [] Yes  []No        Number of [address]  [] Yes  []No       Meals on Wheels  [] Yes  []No   Office on Aging  [] Yes  []No   Transportation Services  [] Yes  []No   Nursing Home  [] Yes  []No        Nursing Home Name    [address]  [] Yes  []No        [address] Name    Other       Information Source:      Information obtained from  [x] Patient  [] Parent   [] [address]  [] Child  [] Spouse   [] Significant Other/Partner   [] Friend      [] EMS    [] Nursing Home Chart          [] Other:   Chart Review  [x] Yes  []No     Family/Support System:    Patient lives with  [] Alone    [x] Spouse   [] Significant Other  [] Children  [] Caretaker   [] Parent  [] Sibling     [] Other       Other Support System:    Is the patient responsible for care of others [] Yes  [x]No   Information of person caring for patient on  discharge    Managers financial affairs independently  [x] Yes  []No   If no, explain:      Status Prior to Admission:    Mental Status  [x] Awake  [x] Alert  [x] Oriented  [] Quiet/Calm [] Lethargic/Sedated   [] Disoriented  [] Restless/Anxious  [] Combative   Personal Care  [] Dependent  [x] [address]  [] Requires Assistance   Meal Preparation Ability  [x] Independent   [] Standby Assistance   [] Minimal Assistance   [] Moderate Assistance  [] Maximum Assistance     [] Total Assistance   Chores  [x] Independent with Chores   [] N/A Nursing Home Resident   [] Requires Assistance   Bowel/Bladder  [x] Continent  [] Catheter  [] Incontinent  [] Ostomy Self-Care    [] Urine Diversion Self-Care  [] Maximum Assistance     [] Total Assistance   Number of Persons needed for assistance    DME at home  [] [facility], [address], [place]  [] [address] [place] [address], Ne, Straight   [] Commode    [] Bathroom/Grab Bars  [] Hospital Bed  [] Nebulizer  [] Oxygen           [] Raised Toilet Seat  [] Shower Chair  [] Side Rails for Bed   [] Tub Transfer Bench   [] Deniz Menezes  [] Reji Espana      [] Other:   Vendor      Treatment Presently Receiving:    Current Treatments  [] Chemotherapy  [] Dialysis  [] Insulin  [] IVAB [x] IVF   [] O2  [] PCA   [] PT   [] RT   [] Tube Feedings   [] Wound Care     Psychosocial Evaluation:    Verbalized Knowledge of Disease Process  [] Patient  []Family   Coping with Disease Process  [] Patient  []Family   Requires Further Counseling Coping with Disease Process  [] Patient  []Family     Identified Projected Needs:    Home Health Aid  [] Yes  []No   Transportation  [] Yes  []No   Education  [] Yes  []No        Specific Education     Financial Counseling  [] Yes  []No   Inability to Care for Self/Will Require 24 hour care  [] Yes  []No   Pain Management  [] Yes  []No   Home Infusion Therapy  [] Yes  []No   Oxygen Therapy  [] Yes  []No   DME  [] Yes  []No [address] Placement  [] Yes  []No   Rehab  [] Yes  []No   Physical Therapy  [x] Yes  []No   Needs Anticipated At This Time  [x] Yes  []No     Intra-Hospital Referral:    [facility]  [] Yes  []No     [] Yes  []No   Patient Representative  [] Yes  []No   Staff for Teaching Needs  [] Yes  []No   Specialty Teaching Needs     Diabetic Educator  [] Yes  []No   Referral for Diabetic Educator Needed  [] Yes  []No  If Yes, place order for Nutritionist or Diabetic Consult     Tentative Discharge Plan:    Home with No Services  [] Yes  [x]No   Home with [address]  [] Yes  []No        If Yes, specify type    [address]  [] Yes  []No        If Yes, specify type    Meals on Wheels  [] Yes  []No   Office of Aging  [] Yes  []No   NHP  [] Yes  []No   Return to the [facility]  [] Yes  []No   Rehab Therapy  [] Yes  []No   Acute Rehab  [] Yes  []No   Subacute Rehab  [] Yes  []No   Private Care  [] Yes  []No   Substance Abuse Referral  [] Yes  []No   Transportation  [] Yes  []No   Chore Service  [] Yes  []No   Inpatient Hospice  [] Yes  []No   OP RT  [] Yes  [] No   OP Hemo  [] Yes  [] No   OP PT  [] Yes  []No   Support Group  [] Yes  []No   Reach to Recovery  [] Yes  []No   OP Oncology Clinic  [] Yes  []No   Clinic Appointment  [] Yes  []No   DME  [] Yes  []No   Comments    Name of D/C Planner or  Given to Patient or Family Kermit Renae RN BSN  Outcomes Manager  Pager # [phone]   Phone Number         Extension    Date 2/14/2018   Time 1430   If you are discharged home, whom do you designate to participate in your discharge plan and receive any information needed?      Enter name of designee spouse        Phone # of designee         Address of designee         Updated         Patient refused to designate any           individual

## 2018-02-14 NOTE — PROGRESS NOTES
Physical Exam   HENT:   Head:       Skin:        Primary Nurse Jaida Cheatham RN and Nik Cabello RN performed a dual skin assessment on this patient Impairment noted- see wound doc flow sheet.

## 2018-02-14 NOTE — PROGRESS NOTES
Problem: Craniotomy: Day of Surgery  Goal: Consults, if ordered  Outcome: Progressing Towards Goal  Medicine consulted tow np seen pt

## 2018-02-14 NOTE — PROGRESS NOTES
Problem: Craniotomy:Post Op Day 1  Goal: Activity/Safety  Outcome: Progressing Towards Goal  PT ordered. Goal: Nutrition/Diet  Outcome: Progressing Towards Goal  Advanced to regular diet. Goal: Discharge Planning  Outcome: Progressing Towards Goal  Case management on board. Goal: Medications  Outcome: Progressing Towards Goal  Pain well-controlled on current regimen. Goal: Respiratory  Outcome: Progressing Towards Goal  Meeting goals w/ ICS. Problem: Falls - Risk of  Goal: *Absence of Falls  Document Charline Fall Risk and appropriate interventions in the flowsheet.    Outcome: Progressing Towards Goal  Fall Risk Interventions:  Mobility Interventions: Assess mobility with egress test, Bed/chair exit alarm, Communicate number of staff needed for ambulation/transfer, OT consult for ADLs, Patient to call before getting OOB, PT Consult for mobility concerns, PT Consult for assist device competence, Strengthening exercises (ROM-active/passive)    Mentation Interventions: Adequate sleep, hydration, pain control, Bed/chair exit alarm, Door open when patient unattended, Evaluate medications/consider consulting pharmacy, Familiar objects from home, Increase mobility, More frequent rounding, Reorient patient, Room close to nurse's station, Toileting rounds, Update white board    Medication Interventions: Bed/chair exit alarm, Assess postural VS orthostatic hypotension, Evaluate medications/consider consulting pharmacy, Patient to call before getting OOB, Teach patient to arise slowly, Utilize gait belt for transfers/ambulation    Elimination Interventions: Bed/chair exit alarm, Call light in reach, Patient to call for help with toileting needs, Toileting schedule/hourly rounds    History of Falls Interventions: Door open when patient unattended

## 2018-02-14 NOTE — ROUTINE PROCESS
Bedside and Verbal shift change report given to Ken Mishra RN by Chantal Church RN. Report included the following information SBAR, Kardex, Intake/Output and MAR.

## 2018-02-14 NOTE — PROGRESS NOTES
Problem: Craniotomy:Post Op Day 1  Goal: Activity/Safety  Outcome: Progressing Towards Goal  Pt ambulated with standby assist  Goal: Nutrition/Diet  Outcome: Progressing Towards Goal  Pt tolerated diet; denies nausea  Goal: *Optimal pain control at patient's stated goal  Outcome: Progressing Towards Goal  Pt reports pain well controlled    Problem: Falls - Risk of  Goal: *Absence of Falls  Document Charline Fall Risk and appropriate interventions in the flowsheet.    Outcome: Progressing Towards Goal  Fall Risk Interventions:  Mobility Interventions: Communicate number of staff needed for ambulation/transfer, Patient to call before getting OOB, PT Consult for mobility concerns, OT consult for ADLs, PT Consult for assist device competence, Utilize walker, cane, or other assitive device    Mentation Interventions: Adequate sleep, hydration, pain control, Toileting rounds, Update white board    Medication Interventions: Patient to call before getting OOB, Teach patient to arise slowly    Elimination Interventions: Call light in reach, Patient to call for help with toileting needs, Toileting schedule/hourly rounds    History of Falls Interventions: Consult care management for discharge planning

## 2018-02-14 NOTE — PROGRESS NOTES
Physical Exam   Skin:         Primary Nurse Jarvis Jackson RN and alba brown, RN performed a dual skin assessment on this patient   Cory score is 17   .

## 2018-02-15 VITALS
HEIGHT: 62 IN | SYSTOLIC BLOOD PRESSURE: 92 MMHG | RESPIRATION RATE: 16 BRPM | BODY MASS INDEX: 21.38 KG/M2 | WEIGHT: 116.18 LBS | HEART RATE: 67 BPM | TEMPERATURE: 98.8 F | OXYGEN SATURATION: 96 % | DIASTOLIC BLOOD PRESSURE: 56 MMHG

## 2018-02-15 LAB
ANION GAP SERPL CALC-SCNC: 7 MMOL/L (ref 3–18)
BUN SERPL-MCNC: 12 MG/DL (ref 7–18)
BUN/CREAT SERPL: 19 (ref 12–20)
CALCIUM SERPL-MCNC: 8.2 MG/DL (ref 8.5–10.1)
CHLORIDE SERPL-SCNC: 109 MMOL/L (ref 100–108)
CO2 SERPL-SCNC: 27 MMOL/L (ref 21–32)
CREAT SERPL-MCNC: 0.62 MG/DL (ref 0.6–1.3)
GLUCOSE BLD STRIP.AUTO-MCNC: 144 MG/DL (ref 70–110)
GLUCOSE SERPL-MCNC: 142 MG/DL (ref 74–99)
POTASSIUM SERPL-SCNC: 4.2 MMOL/L (ref 3.5–5.5)
SODIUM SERPL-SCNC: 143 MMOL/L (ref 136–145)

## 2018-02-15 PROCEDURE — 80048 BASIC METABOLIC PNL TOTAL CA: CPT | Performed by: NEUROLOGICAL SURGERY

## 2018-02-15 PROCEDURE — 74011250636 HC RX REV CODE- 250/636: Performed by: NEUROLOGICAL SURGERY

## 2018-02-15 PROCEDURE — 74011250637 HC RX REV CODE- 250/637: Performed by: NEUROLOGICAL SURGERY

## 2018-02-15 PROCEDURE — 36415 COLL VENOUS BLD VENIPUNCTURE: CPT | Performed by: NEUROLOGICAL SURGERY

## 2018-02-15 PROCEDURE — 74011250637 HC RX REV CODE- 250/637: Performed by: NURSE PRACTITIONER

## 2018-02-15 PROCEDURE — 82962 GLUCOSE BLOOD TEST: CPT

## 2018-02-15 RX ORDER — DEXAMETHASONE 4 MG/1
TABLET ORAL
Qty: 18 TAB | Refills: 0 | Status: SHIPPED | OUTPATIENT
Start: 2018-02-15 | End: 2018-02-20

## 2018-02-15 RX ORDER — OXYCODONE AND ACETAMINOPHEN 5; 325 MG/1; MG/1
1-2 TABLET ORAL
Qty: 30 TAB | Refills: 0 | Status: SHIPPED | OUTPATIENT
Start: 2018-02-15 | End: 2021-03-24

## 2018-02-15 RX ADMIN — DEXAMETHASONE SODIUM PHOSPHATE 4 MG: 4 INJECTION, SOLUTION INTRAMUSCULAR; INTRAVENOUS at 08:59

## 2018-02-15 RX ADMIN — OXYCODONE HYDROCHLORIDE AND ACETAMINOPHEN 2 TABLET: 5; 325 TABLET ORAL at 09:44

## 2018-02-15 RX ADMIN — OXYCODONE HYDROCHLORIDE AND ACETAMINOPHEN 2 TABLET: 5; 325 TABLET ORAL at 05:30

## 2018-02-15 RX ADMIN — DEXAMETHASONE SODIUM PHOSPHATE 4 MG: 4 INJECTION, SOLUTION INTRAMUSCULAR; INTRAVENOUS at 04:08

## 2018-02-15 RX ADMIN — OXYCODONE HYDROCHLORIDE AND ACETAMINOPHEN 2 TABLET: 5; 325 TABLET ORAL at 01:38

## 2018-02-15 RX ADMIN — BACITRACIN, NEOMYCIN, POLYMYXIN B 1 PACKET: 400; 3.5; 5 OINTMENT TOPICAL at 09:44

## 2018-02-15 NOTE — PROGRESS NOTES
Care Management Interventions  PCP Verified by CM: Yes (Dr New aBi)  Mode of Transport at Discharge:  Other (see comment)  Transition of Care Consult (CM Consult): Discharge Planning  Current Support Network: Lives with Spouse, Own Home  Confirm Follow Up Transport: Self  Plan discussed with Pt/Family/Caregiver: Yes  Discharge Location  Discharge Placement: Home

## 2018-02-15 NOTE — ROUTINE PROCESS
2032-The patient is alert and oriented x 4. There is no apparent signs of distress. The patient is in stable condition. Family at the beside. Dressing is clean, dry and intact. Pedal pulses are intact. The patient is voiding. The patient has a small amount of swelling around right eye. No new changes. 2200-The patient is resting. 2312-The patient ambulated to the bathroom with assistance. 0030-The patient is sleeping in bed.    0222-The patient is sleeping. 0538-Awake in bed.

## 2018-02-15 NOTE — PROGRESS NOTES
Progress Note      Patient: Ariadne Jaramillo               Sex: female          DOA: 2/13/2018         YOB: 1970      Age:  50 y.o.        LOS:  LOS: 2 days                  Procedure(s):  IMAGE GUIDED RIGHT FRONTAL CRANIOTOMY FOR TUMOR RESECTION  1. Right pterional craniotomy for resection of meningioma. 2.  Secondary dural grafting at the skull base with sutured graft, onlay graft and dural sealant. 3.  Cranioplasty skull reconstruction. 4.  Intraoperative microdissection. 5.  Intraoperative image guidance, surgical navigation with Stealth system. SURGERY DATE: 2/13/2018               Dx: BRAIN TUMOR  Brain tumor [facility])        Past Medical History:   Diagnosis Date    Arthritis     Asthma     GERD (gastroesophageal reflux disease)     History of endometriosis 05/27/2008    History of herpes simplex infection     Hx of migraines     Meningioma (HCC)     Nausea & vomiting     Other ill-defined conditions(799.89)     low bp    Other ill-defined conditions(799.89)     mvp minor    Other ill-defined conditions(799.89)     IBS, diverticulosis       Past Surgical History:   Procedure Laterality Date    HX BREAST AUGMENTATION      breast implants    HX CHOLECYSTECTOMY      HX HERNIA REPAIR  12/01/2015    mesh    HX HYSTERECTOMY  05/27/2008    partial    HX SALPINGO-OOPHORECTOMY  07/27/2012    BSO, lysis of adhesions, cystoscopy       POD# 2  SUBJECTIVE:  Denies headache. Denies visual changes or double vision. Denies nausea.  + Flatus and is voiding without difficulty.        OBJECTIVE:  Patient Vitals for the past 24 hrs:   Temp Pulse Resp BP SpO2   02/15/18 0726 98.8 °F (37.1 °C) 67 16 92/56 96 %   02/15/18 0407 98.7 °F (37.1 °C) 67 14 101/63 97 %   02/15/18 0131 99 °F (37.2 °C) 67 19 111/64 94 %   02/14/18 2011 98.1 °F (36.7 °C) 64 19 101/59 95 %   02/14/18 2002 97.8 °F (36.6 °C) 64 20 130/73 99 %   02/14/18 1804 98.5 °F (36.9 °C) 77 14 105/58 95 %   02/14/18 1300 - 65 13 107/57 97 %   02/14/18 1200 100 °F (37.8 °C) 72 16 100/60 97 %   02/14/18 1100 - 85 16 94/56 97 %   02/14/18 1000 - 63 10 96/54 96 %           Vent         Intake and Output    Intake/Output Summary (Last 24 hours) at 02/15/18 0938  Last data filed at 02/15/18 8268   Gross per 24 hour   Intake           3587.5 ml   Output             3075 ml   Net            512.5 ml         Data    Recent Results (from the past 24 hour(s))   GLUCOSE, POC    Collection Time: 02/14/18 11:19 AM   Result Value Ref Range    Glucose (POC) 200 (H) 70 - 110 mg/dL   GLUCOSE, POC    Collection Time: 02/14/18  4:14 PM   Result Value Ref Range    Glucose (POC) 161 (H) 70 - 110 mg/dL   GLUCOSE, POC    Collection Time: 02/14/18  9:10 PM   Result Value Ref Range    Glucose (POC) 169 (H) 70 - 022 mg/dL   METABOLIC PANEL, BASIC    Collection Time: 02/15/18  5:17 AM   Result Value Ref Range    Sodium 143 136 - 145 mmol/L    Potassium 4.2 3.5 - 5.5 mmol/L    Chloride 109 (H) 100 - 108 mmol/L    CO2 27 21 - 32 mmol/L    Anion gap 7 3.0 - 18 mmol/L    Glucose 142 (H) 74 - 99 mg/dL    BUN 12 7.0 - 18 MG/DL    Creatinine 0.62 0.6 - 1.3 MG/DL    BUN/Creatinine ratio 19 12 - 20      GFR est AA >60 >60 ml/min/1.73m2    GFR est non-AA >60 >60 ml/min/1.73m2    Calcium 8.2 (L) 8.5 - 10.1 MG/DL   GLUCOSE, POC    Collection Time: 02/15/18  5:54 AM   Result Value Ref Range    Glucose (POC) 144 (H) 70 - 110 mg/dL        Current Facility-Administered Medications   Medication Dose Route Frequency    neomycin-bacitracnZn-polymyxnB (NEOSPORIN) ointment 1 Packet  1 Packet Topical TID    vancomycin (VANCOCIN) 1,000 mg in 0.9% sodium chloride (MBP/ADV) 250 mL  1,000 mg IntraVENous Q18H    dextrose (D50W) injection syrg 12.5-25 g  25-50 mL IntraVENous PRN    insulin lispro (HUMALOG) injection   SubCUTAneous AC&HS    glucose chewable tablet 16 g  4 Tab Oral PRN    glucagon (GLUCAGEN) injection 1 mg  1 mg IntraMUSCular PRN    dextrose (D50W) injection syrg 12.5-25 g  25-50 mL IntraVENous PRN    topiramate (TOPAMAX) tablet 50 mg  50 mg Oral DAILY    ondansetron (ZOFRAN) injection 4 mg  4 mg IntraVENous Q4H PRN    dexamethasone (DECADRON) 4 mg/mL injection 4 mg  4 mg IntraVENous Q6H    [START ON 2/16/2018] dexamethasone (DECADRON) 4 mg/mL injection 4 mg  4 mg IntraVENous Q8H    [START ON 2/17/2018] dexamethasone (DECADRON) 4 mg/mL injection 2 mg  2 mg IntraVENous Q6H    [START ON 2/18/2018] dexamethasone (DECADRON) 4 mg/mL injection 2 mg  2 mg IntraVENous Q8H    [START ON 2/19/2018] dexamethasone (DECADRON) 4 mg/mL injection 2 mg  2 mg IntraVENous Q12H    bisacodyl (DULCOLAX) tablet 10 mg  10 mg Oral DAILY PRN    famotidine (PF) (PEPCID) 20 mg in sodium chloride 0.9 % 10 mL injection  20 mg IntraVENous Q12H    labetalol (NORMODYNE;TRANDATE) injection 10-20 mg  10-20 mg IntraVENous Q30MIN PRN    metoprolol (LOPRESSOR) injection 2.5 mg  2.5 mg IntraVENous Q6H PRN    0.9% sodium chloride with KCl 20 mEq/L infusion   IntraVENous CONTINUOUS    acetaminophen (TYLENOL) tablet 650 mg  650 mg Oral Q6H PRN    oxyCODONE-acetaminophen (PERCOCET) 5-325 mg per tablet 1-2 Tab  1-2 Tab Oral Q4H PRN    morphine injection 2-4 mg  2-4 mg IntraVENous Q1H PRN    VANCOMYCIN INFORMATION NOTE   Other ONCE    VANCOMYCIN INFORMATION NOTE   Other Rx Dosing/Monitoring       Physical Exam  General:  Neurologic: Alert and oriented X 3  Sensory: normal  Eyes: conjunctivae/corneas clear. PERRL, EOM's intact. Visual fields were intact to confrontation  Eye movements were full and conjugate, saccades were accurate, pursuit movements were smooth, and there was no nystagmus. The palate and tongue moved in the midline. Motor: 5/5 throughout  Skin: OD periorbital edema and ecchymosis. Incision to right temporal area with staples intact, edges well approximated, no erythema, no edema, no drainage, no ecchymosis, and no hematoma.   Has 1.5\" x 1\" of breakdown to center of forehead with serous weeping. Assessment/Plan    50y.o. year old female who is hospital day 2 for <principal problem not specified>.     1.)  Neurologic:  Doing well. PRN analgesics for headache. Standard steroid taper in place. Neosporin TID to forehead. Home today. 2.)  Cardiovascular:  Keep SBP < 140.      3.)  Pulmonary:  Pulmonary toilet.     Assessment and plan made with Dr. Davian Wyman, NP  2/15/2018

## 2018-02-15 NOTE — PROGRESS NOTES
Problem: Falls - Risk of  Goal: *Absence of Falls  Document Charline Fall Risk and appropriate interventions in the flowsheet.    Outcome: Progressing Towards Goal  Fall Risk Interventions:  Mobility Interventions: Communicate number of staff needed for ambulation/transfer, OT consult for ADLs, Patient to call before getting OOB, PT Consult for mobility concerns, PT Consult for assist device competence    Mentation Interventions: Adequate sleep, hydration, pain control, Toileting rounds, Update white board, Increase mobility    Medication Interventions: Patient to call before getting OOB, Teach patient to arise slowly    Elimination Interventions: Call light in reach, Patient to call for help with toileting needs, Toileting schedule/hourly rounds    History of Falls Interventions: Consult care management for discharge planning

## 2018-02-15 NOTE — DISCHARGE INSTRUCTIONS
[facility]. [facility] [address], [address]  Phone:  [phone]  Fax:  [phone]    Dr. Ricci Galeazzi    Discharge Instructions: With your recent surgery it is not unusual to experience some pain or discomfort in the area of previous pain or the incision. Accordingly, you have been given pain medication for your comfort until the healing process is further along. As time progresses, you should notice the frequency and the intensity of your discomfort steadily decrease. Here are some simple post-operative instructions to help during your home convalescence.     1. Use your pain medication as directed. Refills should be requested during regular office hours and not on weekends by calling [phone]. Apply over the counter bacitracin to forehead three times a day. Decadron is a steroid medication and needs to be taken as directed. You need to take 4mg every 6 hours for 7 doses: 2/15/18 starting at 1200 today. Then continue at 1800, MN, and 0600. Then start 4 mg every 8 hours for 3 doses, then 2mg every 6hours for 4 doses, then 2mg every 8 hours for 3 doses, then 2mg every 12 hours for 2 doses. After this medication should be complete.       2. Continue any regular medicine for other conditions (e.g. blood pressure, diabetes, heart, heart problems, etc.)     3. You may ride in a car for short trips. Dr. Isai Julien will discuss with you when you can drive.     4. No bending, lifting or strenuous activities. If you need to get to the floor, squat instead of bending.     5. Showers are fine and you may wash your hair.       6. Avoid exercises except for walking. Begin with frequent, but short, periods of walking and gradually build up to longer periods of time.     7. Sit for short periods of time (10-15 minutes) in the first week after surgery.     8. You may resume sexual relations as comfort level allows.     9.  Notify us if you develop fever, painful redness around the incision or drainage from the wound.     10. Your follow-up appointment is 2/21/18 at 21 [phone] with Dr. Gregg Curry.                 If you have any questions, please contact our office at [phone]   Thank You      Patient Signature: ________________________  Date: February 15, 2018          DISCHARGE SUMMARY from Nurse    PATIENT INSTRUCTIONS:    After general anesthesia or intravenous sedation, for 24 hours or while taking prescription Narcotics:  · Limit your activities  · Do not drive and operate hazardous machinery  · Do not make important personal or business decisions  · Do  not drink alcoholic beverages  · If you have not urinated within 8 hours after discharge, please contact your surgeon on call. Report the following to your surgeon:  · Excessive pain, swelling, redness or odor of or around the surgical area  · Temperature over 100.5  · Nausea and vomiting lasting longer than 4 hours or if unable to take medications  · Any signs of decreased circulation or nerve impairment to extremity: change in color, persistent  numbness, tingling, coldness or increase pain  · Any questions    What to do at Home:  Recommended activity: No lifting, Driving, or Strenuous exercise until cleared by surgeon. Neosporin three times a day to forehead. If you experience any of the following symptoms severe pain, nausea and vomiting, fever above 100.5, bleeding or drainage from incision, shortness of breath, please follow up with Dr. Gregg Curry. *  Please give a list of your current medications to your Primary Care Provider. *  Please update this list whenever your medications are discontinued, doses are      changed, or new medications (including over-the-counter products) are added. *  Please carry medication information at all times in case of emergency situations.     These are general instructions for a healthy lifestyle:    No smoking/ No tobacco products/ Avoid exposure to second hand smoke  Surgeon Jennifer Allen Warning:  Quitting smoking now greatly reduces serious risk to your health. Obesity, smoking, and sedentary lifestyle greatly increases your risk for illness    A healthy diet, regular physical exercise & weight monitoring are important for maintaining a healthy lifestyle    You may be retaining fluid if you have a history of heart failure or if you experience any of the following symptoms:  Weight gain of 3 pounds or more overnight or 5 pounds in a week, increased swelling in our hands or feet or shortness of breath while lying flat in bed. Please call your doctor as soon as you notice any of these symptoms; do not wait until your next office visit. Recognize signs and symptoms of STROKE:    F-face looks uneven    A-arms unable to move or move unevenly    S-speech slurred or non-existent    T-time-call 911 as soon as signs and symptoms begin-DO NOT go       Back to bed or wait to see if you get better-TIME IS BRAIN. Warning Signs of HEART ATTACK     Call 911 if you have these symptoms:   Chest discomfort. Most heart attacks involve discomfort in the center of the chest that lasts more than a few minutes, or that goes away and comes back. It can feel like uncomfortable pressure, squeezing, fullness, or pain.  Discomfort in other areas of the upper body. Symptoms can include pain or discomfort in one or both arms, the back, neck, jaw, or stomach.  Shortness of breath with or without chest discomfort.  Other signs may include breaking out in a cold sweat, nausea, or lightheadedness. Don't wait more than five minutes to call 911 - MINUTES MATTER! Fast action can save your life. Calling 911 is almost always the fastest way to get lifesaving treatment. Emergency Medical Services staff can begin treatment when they arrive -- up to an hour sooner than if someone gets to the hospital by car. The discharge information has been reviewed with the patient. The patient verbalized understanding.   Discharge medications reviewed with the patient and appropriate educational materials and side effects teaching were provided. Patient armband removed and shredded.   ___________________________________________________________________________________________________________________________________

## 2018-02-15 NOTE — PROGRESS NOTES
Pt has been moving about the room independently with spouse present, feels comfortable mobilizing and performed stair nego yesterday. Reviewed precautions, brain healing, decrease stress and overstimulation while healing. Pt d/c'ing home this morning.

## 2018-02-15 NOTE — DISCHARGE SUMMARY
Discharge Summary    Patient: Jacky Longo               Sex: female          DOA: 2/13/2018         YOB: 1970      Age:  50 y.o.        LOS:  LOS: 2 days           Admit Date: 2/13/2018    Discharge Date: 2/15/2018    Consults: Hospitalist    Admission Diagnoses: BRAIN TUMOR  Brain tumor [facility])    Discharge Diagnoses:    Problem List as of 2/15/2018  Date Reviewed: 9/27/2017          Codes Class Noted - Resolved    Brain tumor [facility]) ICD-10-CM: D49.6  ICD-9-CM: 239.6  2/13/2018 - Present        Asthma ICD-10-CM: J45.909  ICD-9-CM: 493.90  Unknown - Present        GERD (gastroesophageal reflux disease) ICD-10-CM: K21.9  ICD-9-CM: 530.81  Unknown - Present        Arthritis ICD-10-CM: M19.90  ICD-9-CM: 716.90  Unknown - Present        History of herpes simplex infection ICD-10-CM: Z86.19  ICD-9-CM: V12.09  Unknown - Present        Meningioma ([address] ICD-10-CM: D32.9  ICD-9-CM: 225.2  Unknown - Present        Hx of migraines ICD-10-CM: Z86.69  ICD-9-CM: V12.49  Unknown - Present        Elevated LFTs ICD-10-CM: R79.89  ICD-9-CM: 790.6  8/3/2016 - Present        History of endometriosis ICD-10-CM: Z87.42  ICD-9-CM: V13.29  5/27/2008 - Present        RESOLVED: Viral syndrome ICD-10-CM: B34.9  ICD-9-CM: 079.99  8/3/2016 - 9/27/2017        RESOLVED: Nausea ICD-10-CM: R11.0  ICD-9-CM: 787.02  8/3/2016 - 9/27/2017        RESOLVED: Otitis external ICD-10-CM: H60.90  ICD-9-CM: 380.10  8/3/2016 - 9/27/2017              Allergies   Allergen Reactions    Pcn [Penicillins] Hives          Operative Procedure Performed: Procedure(s):  IMAGE GUIDED RIGHT FRONTAL CRANIOTOMY FOR TUMOR RESECTION   1.  Right pterional craniotomy for resection of meningioma. 2.  Secondary dural grafting at the skull base with sutured graft, onlay graft and dural sealant.    3.  Cranioplasty skull reconstruction.    4.  Intraoperative microdissection. 5.  Intraoperative image guidance, surgical navigation with Stealth system.    Data:   Patient Vitals for the past 12 hrs:   Temp Pulse Resp BP SpO2   02/15/18 0726 98.8 °F (37.1 °C) 67 16 92/56 96 %   02/15/18 0407 98.7 °F (37.1 °C) 67 14 101/63 97 %   02/15/18 0131 99 °F (37.2 °C) 67 19 111/64 94 %       HPI:  Ms. Dion Jackson  is a 50 y.o.  female with PMH of migraines and asthma who is followed by Dr. Ivan Combs as an outpatient for headaches. She developed fairly acute headaches in 2013 which have been progressive. She was found to have a small right frontal meningioma. The option of surveillance versus resection and she requested to undergo the above procedure. .    Patient did obtain medical clearance from their primary care provider prior to undergoing elective surgery. Hospital Course: Ms. Dion Jackson was admitted to the hospital on 2/13/2018  8:02 AM .  The patient underwent the above named procedure. Remained hemodynamically stable during their hospitalization. Received appropriate pre and post operative prophylactic antibiotics as well as maintained on SCDs. Physical Exam:  Neurologic: Alert and oriented X 3  Sensory: normal  Eyes: conjunctivae/corneas clear. PERRL, EOM's intact. Visual fields were intact to confrontation  Eye movements were full and conjugate, saccades were accurate, pursuit movements were smooth, and there was no nystagmus. The palate and tongue moved in the midline. Motor: 5/5 throughout  Skin: OD periorbital edema and ecchymosis. Incision to right temporal area with staples intact, edges well approximated, no erythema, no edema, no drainage, no ecchymosis, and no hematoma. Has 1.5\" x 1\" of breakdown to center of forehead with serous weeping. Condition at discharge is good. Pain was controlled with prn analgesics. She is to apply bacitracin to her forehead three times a day until healed. Steroid taper will continue. Pathology is pending.   She is following up in office 2/21/18 at 1030 for staple removal.      Discharge Medications:     Current Discharge Medication List      START taking these medications    Details   oxyCODONE-acetaminophen (PERCOCET) 5-325 mg per tablet Take 1-2 Tabs by mouth every six (6) hours as needed. Max Daily Amount: 8 Tabs. Indications: Pain  Qty: 30 Tab, Refills: 0    Associated Diagnoses: Meningioma ([address]      dexamethasone (DECADRON) 4 mg tablet Take 4 mg every 6hrs for 7 doses (2/15-16), then 4 mg every 8hrs for 3 doses (2/17), then 2mg every 6hrs for 4 doses (2/18), then 2 mg every 8hrs for 3 doses (2/19), and then 2mg every 12 hours for 2 doses (2/20). Qty: 18 Tab, Refills: 0         CONTINUE these medications which have NOT CHANGED    Details   cyanocobalamin 1,000 mcg tablet Take 1,000 mcg by mouth daily. multivitamin (ONE A DAY) tablet Take 1 Tab by mouth daily. Ztmubupf-Dmku-Maeasp-Hyalur Ac 652-472-00-2 mg cap Take 2 Tabs by mouth daily. ascorbic acid, vitamin C, (VITAMIN C) 500 mg tablet Take 500 mg by mouth daily. topiramate (TOPAMAX) 50 mg tablet TK 1 T PO QPM  Refills: 3      VITAMIN E, DL,TOCOPHERYL ACET, (VITAMIN E ACETATE) 400 unit Cap capsule Take 400 Units by mouth daily. Cetirizine 10 mg Cap Take 10 mg by mouth daily. Bifidobacterium Infantis (ALIGN) 4 mg Cap Take 4 mg by mouth daily. Cholecalciferol, Vitamin D3, (VITAMIN D3) 1,000 unit Cap Take 5,000 Units by mouth daily. calcium citrate 200 mg (950 mg) tablet Take 630 mg by mouth daily. omeprazole (PRILOSEC OTC) 20 mg tablet Take 20 mg by mouth daily. Discharge Instructions: With your recent surgery it is not unusual to experience some pain or discomfort in the area of previous pain or the incision. Accordingly, you have been given pain medication for your comfort until the healing process is further along. As time progresses, you should notice the frequency and the intensity of your discomfort steadily decrease.   Here are some simple post-operative instructions to help during your home convalescence. 1. Use your pain medication as directed. Refills should be requested during regular office hours and not on weekends by calling [phone]. Apply over the counter bacitracin to forehead three times a day. Decadron is a steroid medication and needs to be taken as directed. You need to take 4mg every 6 hours for 7 doses: 2/15/18 starting at 1200 today. Then continue at 1800, MN, and 0600. Then start 4 mg every 8 hours for 3 doses, then 2mg every 6hours for 4 doses, then 2mg every 8 hours for 3 doses, then 2mg every 12 hours for 2 doses. After this medication should be complete. 2. Continue any regular medicine for other conditions (e.g. blood pressure, diabetes, heart, heart problems, etc.)    3. You may ride in a car for short trips. Dr. Liss Machado will discuss with you when you can drive. 4. No bending, lifting or strenuous activities. If you need to get to the floor, squat instead of bending. 5. Showers are fine and you may wash your hair. 6. Avoid exercises except for walking. Begin with frequent, but short, periods of walking and gradually build up to longer periods of time. 7. Sit for short periods of time (10-15 minutes) in the first week after surgery. 8. You may resume sexual relations as comfort level allows. 9. Notify us if you develop fever, painful redness around the incision or drainage from the wound. 10. Your follow-up appointment is 2/21/18 at 1030 with Dr. Liss Machado. Discharge Disposition:  Patient is medically stable for discharge to home from hospital with above discharge medications, instructions, and follow up care. Discharge assessement and plan made with Dr. Liss Machado.           CC:  PCP: Korina Ayers MD

## 2018-02-15 NOTE — ROUTINE PROCESS
Bedside and Verbal shift change report given to Rand Ma RN (oncoming nurse) by Eliana Aiken RN (offgoing nurse). Report included the following information SBAR, Kardex and MAR.

## 2018-09-27 PROBLEM — Z86.010 HISTORY OF COLON POLYPS: Status: ACTIVE | Noted: 2018-09-27

## 2019-02-04 ENCOUNTER — HOSPITAL ENCOUNTER (OUTPATIENT)
Dept: MRI IMAGING | Age: 49
Discharge: HOME | End: 2019-02-04
Attending: NEUROLOGICAL SURGERY
Payer: COMMERCIAL

## 2019-02-04 VITALS — WEIGHT: 117 LBS | BODY MASS INDEX: 21.4 KG/M2

## 2019-02-04 DIAGNOSIS — D49.6 BRAIN TUMOR (HCC): ICD-10-CM

## 2019-02-04 PROCEDURE — 70553 MRI BRAIN STEM W/O & W/DYE: CPT

## 2019-02-04 PROCEDURE — A9575 INJ GADOTERATE MEGLUMI 0.1ML: HCPCS | Performed by: NEUROLOGICAL SURGERY

## 2019-02-04 PROCEDURE — 74011250636 HC RX REV CODE- 250/636: Performed by: NEUROLOGICAL SURGERY

## 2019-02-04 RX ORDER — GADOTERATE MEGLUMINE 376.9 MG/ML
10 INJECTION INTRAVENOUS
Status: COMPLETED | OUTPATIENT
Start: 2019-02-04 | End: 2019-02-04

## 2019-02-04 RX ADMIN — GADOTERATE MEGLUMINE 10 ML: 376.9 INJECTION INTRAVENOUS at 09:35

## 2020-02-21 ENCOUNTER — HOSPITAL ENCOUNTER (OUTPATIENT)
Dept: MRI IMAGING | Age: 50
Discharge: HOME | End: 2020-02-21
Attending: NEUROLOGICAL SURGERY
Payer: COMMERCIAL

## 2020-02-21 VITALS — WEIGHT: 117 LBS | BODY MASS INDEX: 21.4 KG/M2

## 2020-02-21 DIAGNOSIS — D33.0 BENIGN NEOPLASM OF BRAIN, SUPRATENTORIAL (HCC): ICD-10-CM

## 2020-02-21 PROCEDURE — 70553 MRI BRAIN STEM W/O & W/DYE: CPT

## 2020-02-21 PROCEDURE — A9575 INJ GADOTERATE MEGLUMI 0.1ML: HCPCS | Performed by: NEUROLOGICAL SURGERY

## 2020-02-21 PROCEDURE — 74011636320 HC RX REV CODE- 636/320: Performed by: NEUROLOGICAL SURGERY

## 2020-02-21 RX ADMIN — GADOTERATE MEGLUMINE 10 ML: 376.9 INJECTION INTRAVENOUS at 09:37

## 2021-03-23 PROBLEM — K43.9 SPIGELIAN HERNIA: Status: ACTIVE | Noted: 2021-03-23

## 2021-03-23 PROBLEM — K46.9 ABDOMINAL HERNIA: Status: ACTIVE | Noted: 2021-03-23

## 2021-03-23 PROBLEM — I34.1 MITRAL VALVE PROLAPSE: Status: ACTIVE | Noted: 2021-03-23

## 2021-03-23 PROBLEM — Q21.12 PFO (PATENT FORAMEN OVALE): Status: ACTIVE | Noted: 2021-03-23

## 2021-03-23 PROBLEM — Z82.49 FAMILY HISTORY OF PREMATURE CAD: Status: ACTIVE | Noted: 2021-02-24

## 2021-11-08 ENCOUNTER — TRANSCRIBE ORDER (OUTPATIENT)
Dept: REGISTRATION | Age: 51
End: 2021-11-08

## 2021-11-08 DIAGNOSIS — Z01.812 PRE-PROCEDURE LAB EXAM: Primary | ICD-10-CM

## 2021-12-01 ENCOUNTER — HOSPITAL ENCOUNTER (OUTPATIENT)
Dept: PREADMISSION TESTING | Age: 51
Discharge: HOME | End: 2021-12-01
Payer: SELF-PAY

## 2021-12-01 VITALS
WEIGHT: 125.66 LBS | BODY MASS INDEX: 23.12 KG/M2 | HEIGHT: 62 IN | RESPIRATION RATE: 18 BRPM | TEMPERATURE: 97.9 F | DIASTOLIC BLOOD PRESSURE: 61 MMHG | SYSTOLIC BLOOD PRESSURE: 93 MMHG | HEART RATE: 85 BPM

## 2021-12-01 LAB
ANION GAP SERPL CALC-SCNC: 2 MMOL/L (ref 5–15)
ATRIAL RATE: 77 BPM
BASOPHILS # BLD: 0 K/UL (ref 0–0.1)
BASOPHILS NFR BLD: 1 % (ref 0–1)
BUN SERPL-MCNC: 23 MG/DL (ref 6–20)
BUN/CREAT SERPL: 29 (ref 12–20)
CALCIUM SERPL-MCNC: 9.2 MG/DL (ref 8.5–10.1)
CALCULATED P AXIS, ECG09: 72 DEGREES
CALCULATED R AXIS, ECG10: 55 DEGREES
CALCULATED T AXIS, ECG11: 32 DEGREES
CHLORIDE SERPL-SCNC: 104 MMOL/L (ref 97–108)
CO2 SERPL-SCNC: 32 MMOL/L (ref 21–32)
CREAT SERPL-MCNC: 0.79 MG/DL (ref 0.55–1.02)
DIAGNOSIS, 93000: NORMAL
DIFFERENTIAL METHOD BLD: ABNORMAL
EOSINOPHIL # BLD: 0 K/UL (ref 0–0.4)
EOSINOPHIL NFR BLD: 0 % (ref 0–7)
ERYTHROCYTE [DISTWIDTH] IN BLOOD BY AUTOMATED COUNT: 11.9 % (ref 11.5–14.5)
GLUCOSE SERPL-MCNC: 70 MG/DL (ref 65–100)
HCT VFR BLD AUTO: 42.9 % (ref 35–47)
HGB BLD-MCNC: 13.9 G/DL (ref 11.5–16)
IMM GRANULOCYTES # BLD AUTO: 0 K/UL (ref 0–0.04)
IMM GRANULOCYTES NFR BLD AUTO: 0 % (ref 0–0.5)
LYMPHOCYTES # BLD: 1.1 K/UL (ref 0.8–3.5)
LYMPHOCYTES NFR BLD: 33 % (ref 12–49)
MCH RBC QN AUTO: 29 PG (ref 26–34)
MCHC RBC AUTO-ENTMCNC: 32.4 G/DL (ref 30–36.5)
MCV RBC AUTO: 89.6 FL (ref 80–99)
MONOCYTES # BLD: 0.3 K/UL (ref 0–1)
MONOCYTES NFR BLD: 10 % (ref 5–13)
NEUTS SEG # BLD: 1.9 K/UL (ref 1.8–8)
NEUTS SEG NFR BLD: 56 % (ref 32–75)
NRBC # BLD: 0 K/UL (ref 0–0.01)
NRBC BLD-RTO: 0 PER 100 WBC
P-R INTERVAL, ECG05: 122 MS
PLATELET # BLD AUTO: 212 K/UL (ref 150–400)
PMV BLD AUTO: 11.4 FL (ref 8.9–12.9)
POTASSIUM SERPL-SCNC: 4 MMOL/L (ref 3.5–5.1)
Q-T INTERVAL, ECG07: 338 MS
QRS DURATION, ECG06: 86 MS
QTC CALCULATION (BEZET), ECG08: 382 MS
RBC # BLD AUTO: 4.79 M/UL (ref 3.8–5.2)
SODIUM SERPL-SCNC: 138 MMOL/L (ref 136–145)
VENTRICULAR RATE, ECG03: 77 BPM
WBC # BLD AUTO: 3.3 K/UL (ref 3.6–11)

## 2021-12-01 PROCEDURE — 80048 BASIC METABOLIC PNL TOTAL CA: CPT

## 2021-12-01 PROCEDURE — 93005 ELECTROCARDIOGRAM TRACING: CPT

## 2021-12-01 PROCEDURE — 85025 COMPLETE CBC W/AUTO DIFF WBC: CPT

## 2021-12-01 PROCEDURE — 36415 COLL VENOUS BLD VENIPUNCTURE: CPT

## 2021-12-03 ENCOUNTER — HOSPITAL ENCOUNTER (OUTPATIENT)
Dept: PREADMISSION TESTING | Age: 51
Discharge: HOME | End: 2021-12-03
Attending: PLASTIC SURGERY
Payer: SELF-PAY

## 2021-12-03 DIAGNOSIS — Z01.812 PRE-PROCEDURE LAB EXAM: ICD-10-CM

## 2021-12-03 PROCEDURE — U0005 INFEC AGEN DETEC AMPLI PROBE: HCPCS

## 2021-12-03 NOTE — PERIOP NOTES
12/3/21 @ 1302 - LEFT VOICE MESSAGE FOR [place], NURSE FOR DR. Raúl Neal, CARDIOLOGIST OFFICE. REQUESTED MOST RECENT OFFICE NOTES, EKG & TEST(S) TO BE FAXED TO [facility].

## 2021-12-04 LAB
SARS-COV-2, XPLCVT: NOT DETECTED
SOURCE, COVRS: NORMAL

## 2021-12-08 ENCOUNTER — HOSPITAL ENCOUNTER (OUTPATIENT)
Age: 51
Setting detail: OUTPATIENT SURGERY
Discharge: HOME | End: 2021-12-08
Attending: PLASTIC SURGERY | Admitting: PLASTIC SURGERY
Payer: SELF-PAY

## 2021-12-08 ENCOUNTER — ANESTHESIA EVENT (OUTPATIENT)
Dept: MEDSURG UNIT | Age: 51
End: 2021-12-08
Payer: SELF-PAY

## 2021-12-08 ENCOUNTER — ANESTHESIA (OUTPATIENT)
Dept: MEDSURG UNIT | Age: 51
End: 2021-12-08
Payer: SELF-PAY

## 2021-12-08 VITALS
SYSTOLIC BLOOD PRESSURE: 106 MMHG | DIASTOLIC BLOOD PRESSURE: 68 MMHG | TEMPERATURE: 97.5 F | HEART RATE: 105 BPM | BODY MASS INDEX: 22.98 KG/M2 | WEIGHT: 125.66 LBS | RESPIRATION RATE: 13 BRPM | OXYGEN SATURATION: 96 %

## 2021-12-08 PROCEDURE — 77030008684 HC TU ET CUF COVD -B: Performed by: ANESTHESIOLOGY

## 2021-12-08 PROCEDURE — 74011000250 HC RX REV CODE- 250: Performed by: ANESTHESIOLOGY

## 2021-12-08 PROCEDURE — 74011250636 HC RX REV CODE- 250/636: Performed by: PLASTIC SURGERY

## 2021-12-08 PROCEDURE — 77030002996 HC SUT SLK J&J -A: Performed by: PLASTIC SURGERY

## 2021-12-08 PROCEDURE — 74011250636 HC RX REV CODE- 250/636: Performed by: ANESTHESIOLOGY

## 2021-12-08 PROCEDURE — 76030000006 HC AMB SURG OR TIME 3 TO 3.5: Performed by: PLASTIC SURGERY

## 2021-12-08 PROCEDURE — 74011250636 HC RX REV CODE- 250/636: Performed by: STUDENT IN AN ORGANIZED HEALTH CARE EDUCATION/TRAINING PROGRAM

## 2021-12-08 PROCEDURE — 74011000258 HC RX REV CODE- 258: Performed by: ANESTHESIOLOGY

## 2021-12-08 PROCEDURE — 2709999900 HC NON-CHARGEABLE SUPPLY: Performed by: PLASTIC SURGERY

## 2021-12-08 PROCEDURE — 76210000035 HC AMBSU PH I REC 1 TO 1.5 HR: Performed by: PLASTIC SURGERY

## 2021-12-08 PROCEDURE — 74011250637 HC RX REV CODE- 250/637: Performed by: STUDENT IN AN ORGANIZED HEALTH CARE EDUCATION/TRAINING PROGRAM

## 2021-12-08 PROCEDURE — 74011000250 HC RX REV CODE- 250: Performed by: PLASTIC SURGERY

## 2021-12-08 PROCEDURE — 77030026438 HC STYL ET INTUB CARD -A: Performed by: ANESTHESIOLOGY

## 2021-12-08 PROCEDURE — 76060000066 HC AMB SURG ANES 3 TO 3.5 HR: Performed by: PLASTIC SURGERY

## 2021-12-08 RX ORDER — HYDROMORPHONE HYDROCHLORIDE 2 MG/ML
INJECTION, SOLUTION INTRAMUSCULAR; INTRAVENOUS; SUBCUTANEOUS AS NEEDED
Status: DISCONTINUED | OUTPATIENT
Start: 2021-12-08 | End: 2021-12-08 | Stop reason: HOSPADM

## 2021-12-08 RX ORDER — MIDAZOLAM HYDROCHLORIDE 1 MG/ML
0.5 INJECTION, SOLUTION INTRAMUSCULAR; INTRAVENOUS
Status: DISCONTINUED | OUTPATIENT
Start: 2021-12-08 | End: 2021-12-08 | Stop reason: HOSPADM

## 2021-12-08 RX ORDER — KETAMINE HCL IN 0.9 % NACL 50 MG/5 ML
SYRINGE (ML) INTRAVENOUS AS NEEDED
Status: DISCONTINUED | OUTPATIENT
Start: 2021-12-08 | End: 2021-12-08 | Stop reason: HOSPADM

## 2021-12-08 RX ORDER — PROPOFOL 10 MG/ML
INJECTION, EMULSION INTRAVENOUS AS NEEDED
Status: DISCONTINUED | OUTPATIENT
Start: 2021-12-08 | End: 2021-12-08 | Stop reason: HOSPADM

## 2021-12-08 RX ORDER — ROCURONIUM BROMIDE 10 MG/ML
INJECTION, SOLUTION INTRAVENOUS AS NEEDED
Status: DISCONTINUED | OUTPATIENT
Start: 2021-12-08 | End: 2021-12-08 | Stop reason: HOSPADM

## 2021-12-08 RX ORDER — MIDAZOLAM HYDROCHLORIDE 1 MG/ML
INJECTION, SOLUTION INTRAMUSCULAR; INTRAVENOUS AS NEEDED
Status: DISCONTINUED | OUTPATIENT
Start: 2021-12-08 | End: 2021-12-08 | Stop reason: HOSPADM

## 2021-12-08 RX ORDER — SODIUM CHLORIDE 0.9 % (FLUSH) 0.9 %
5-40 SYRINGE (ML) INJECTION EVERY 8 HOURS
Status: DISCONTINUED | OUTPATIENT
Start: 2021-12-08 | End: 2021-12-08 | Stop reason: HOSPADM

## 2021-12-08 RX ORDER — MORPHINE SULFATE 2 MG/ML
2 INJECTION, SOLUTION INTRAMUSCULAR; INTRAVENOUS
Status: DISCONTINUED | OUTPATIENT
Start: 2021-12-08 | End: 2021-12-08 | Stop reason: HOSPADM

## 2021-12-08 RX ORDER — SUCCINYLCHOLINE CHLORIDE 20 MG/ML
INJECTION INTRAMUSCULAR; INTRAVENOUS AS NEEDED
Status: DISCONTINUED | OUTPATIENT
Start: 2021-12-08 | End: 2021-12-08 | Stop reason: HOSPADM

## 2021-12-08 RX ORDER — PHENYLEPHRINE HCL IN 0.9% NACL 0.4MG/10ML
SYRINGE (ML) INTRAVENOUS AS NEEDED
Status: DISCONTINUED | OUTPATIENT
Start: 2021-12-08 | End: 2021-12-08 | Stop reason: HOSPADM

## 2021-12-08 RX ORDER — ACETAMINOPHEN 500 MG
1000 TABLET ORAL ONCE
Status: COMPLETED | OUTPATIENT
Start: 2021-12-08 | End: 2021-12-08

## 2021-12-08 RX ORDER — SODIUM CHLORIDE 9 MG/ML
1000 INJECTION, SOLUTION INTRAVENOUS CONTINUOUS
Status: DISCONTINUED | OUTPATIENT
Start: 2021-12-08 | End: 2021-12-08 | Stop reason: HOSPADM

## 2021-12-08 RX ORDER — SODIUM CHLORIDE 0.9 % (FLUSH) 0.9 %
5-40 SYRINGE (ML) INJECTION AS NEEDED
Status: DISCONTINUED | OUTPATIENT
Start: 2021-12-08 | End: 2021-12-08 | Stop reason: HOSPADM

## 2021-12-08 RX ORDER — MIDAZOLAM HYDROCHLORIDE 1 MG/ML
1 INJECTION, SOLUTION INTRAMUSCULAR; INTRAVENOUS AS NEEDED
Status: DISCONTINUED | OUTPATIENT
Start: 2021-12-08 | End: 2021-12-08 | Stop reason: HOSPADM

## 2021-12-08 RX ORDER — ONDANSETRON 2 MG/ML
INJECTION INTRAMUSCULAR; INTRAVENOUS AS NEEDED
Status: DISCONTINUED | OUTPATIENT
Start: 2021-12-08 | End: 2021-12-08 | Stop reason: HOSPADM

## 2021-12-08 RX ORDER — SODIUM CHLORIDE, SODIUM LACTATE, POTASSIUM CHLORIDE, CALCIUM CHLORIDE 600; 310; 30; 20 MG/100ML; MG/100ML; MG/100ML; MG/100ML
INJECTION, SOLUTION INTRAVENOUS
Status: DISCONTINUED | OUTPATIENT
Start: 2021-12-08 | End: 2021-12-08 | Stop reason: HOSPADM

## 2021-12-08 RX ORDER — FENTANYL CITRATE 50 UG/ML
50 INJECTION, SOLUTION INTRAMUSCULAR; INTRAVENOUS AS NEEDED
Status: DISCONTINUED | OUTPATIENT
Start: 2021-12-08 | End: 2021-12-08 | Stop reason: HOSPADM

## 2021-12-08 RX ORDER — LIDOCAINE HYDROCHLORIDE 20 MG/ML
INJECTION, SOLUTION EPIDURAL; INFILTRATION; INTRACAUDAL; PERINEURAL AS NEEDED
Status: DISCONTINUED | OUTPATIENT
Start: 2021-12-08 | End: 2021-12-08 | Stop reason: HOSPADM

## 2021-12-08 RX ORDER — LIDOCAINE HYDROCHLORIDE 10 MG/ML
0.1 INJECTION, SOLUTION EPIDURAL; INFILTRATION; INTRACAUDAL; PERINEURAL AS NEEDED
Status: DISCONTINUED | OUTPATIENT
Start: 2021-12-08 | End: 2021-12-08 | Stop reason: HOSPADM

## 2021-12-08 RX ORDER — DIPHENHYDRAMINE HYDROCHLORIDE 50 MG/ML
12.5 INJECTION, SOLUTION INTRAMUSCULAR; INTRAVENOUS AS NEEDED
Status: DISCONTINUED | OUTPATIENT
Start: 2021-12-08 | End: 2021-12-08 | Stop reason: HOSPADM

## 2021-12-08 RX ORDER — SCOLOPAMINE TRANSDERMAL SYSTEM 1 MG/1
1 PATCH, EXTENDED RELEASE TRANSDERMAL
Status: DISCONTINUED | OUTPATIENT
Start: 2021-12-08 | End: 2021-12-08 | Stop reason: HOSPADM

## 2021-12-08 RX ORDER — HYDROMORPHONE HYDROCHLORIDE 1 MG/ML
0.2 INJECTION, SOLUTION INTRAMUSCULAR; INTRAVENOUS; SUBCUTANEOUS
Status: DISCONTINUED | OUTPATIENT
Start: 2021-12-08 | End: 2021-12-08 | Stop reason: HOSPADM

## 2021-12-08 RX ORDER — EPHEDRINE SULFATE/0.9% NACL/PF 50 MG/5 ML
5 SYRINGE (ML) INTRAVENOUS AS NEEDED
Status: DISCONTINUED | OUTPATIENT
Start: 2021-12-08 | End: 2021-12-08 | Stop reason: HOSPADM

## 2021-12-08 RX ORDER — OXYCODONE AND ACETAMINOPHEN 5; 325 MG/1; MG/1
1 TABLET ORAL AS NEEDED
Status: DISCONTINUED | OUTPATIENT
Start: 2021-12-08 | End: 2021-12-08 | Stop reason: HOSPADM

## 2021-12-08 RX ORDER — FENTANYL CITRATE 50 UG/ML
INJECTION, SOLUTION INTRAMUSCULAR; INTRAVENOUS AS NEEDED
Status: DISCONTINUED | OUTPATIENT
Start: 2021-12-08 | End: 2021-12-08 | Stop reason: HOSPADM

## 2021-12-08 RX ORDER — EPHEDRINE SULFATE/0.9% NACL/PF 50 MG/5 ML
SYRINGE (ML) INTRAVENOUS AS NEEDED
Status: DISCONTINUED | OUTPATIENT
Start: 2021-12-08 | End: 2021-12-08 | Stop reason: HOSPADM

## 2021-12-08 RX ORDER — SODIUM CHLORIDE 9 MG/ML
125 INJECTION, SOLUTION INTRAVENOUS CONTINUOUS
Status: DISCONTINUED | OUTPATIENT
Start: 2021-12-08 | End: 2021-12-08 | Stop reason: HOSPADM

## 2021-12-08 RX ORDER — DEXAMETHASONE SODIUM PHOSPHATE 4 MG/ML
INJECTION, SOLUTION INTRA-ARTICULAR; INTRALESIONAL; INTRAMUSCULAR; INTRAVENOUS; SOFT TISSUE AS NEEDED
Status: DISCONTINUED | OUTPATIENT
Start: 2021-12-08 | End: 2021-12-08 | Stop reason: HOSPADM

## 2021-12-08 RX ORDER — CLINDAMYCIN PHOSPHATE 600 MG/50ML
600 INJECTION INTRAVENOUS ONCE
Status: COMPLETED | OUTPATIENT
Start: 2021-12-08 | End: 2021-12-08

## 2021-12-08 RX ORDER — SODIUM CHLORIDE, SODIUM LACTATE, POTASSIUM CHLORIDE, CALCIUM CHLORIDE 600; 310; 30; 20 MG/100ML; MG/100ML; MG/100ML; MG/100ML
1000 INJECTION, SOLUTION INTRAVENOUS CONTINUOUS
Status: DISCONTINUED | OUTPATIENT
Start: 2021-12-08 | End: 2021-12-08 | Stop reason: HOSPADM

## 2021-12-08 RX ORDER — FENTANYL CITRATE 50 UG/ML
25 INJECTION, SOLUTION INTRAMUSCULAR; INTRAVENOUS
Status: DISCONTINUED | OUTPATIENT
Start: 2021-12-08 | End: 2021-12-08 | Stop reason: HOSPADM

## 2021-12-08 RX ORDER — ONDANSETRON 2 MG/ML
4 INJECTION INTRAMUSCULAR; INTRAVENOUS AS NEEDED
Status: DISCONTINUED | OUTPATIENT
Start: 2021-12-08 | End: 2021-12-08 | Stop reason: HOSPADM

## 2021-12-08 RX ORDER — SODIUM CHLORIDE, SODIUM LACTATE, POTASSIUM CHLORIDE, CALCIUM CHLORIDE 600; 310; 30; 20 MG/100ML; MG/100ML; MG/100ML; MG/100ML
125 INJECTION, SOLUTION INTRAVENOUS CONTINUOUS
Status: DISCONTINUED | OUTPATIENT
Start: 2021-12-08 | End: 2021-12-08 | Stop reason: HOSPADM

## 2021-12-08 RX ADMIN — Medication 30 MG: at 13:33

## 2021-12-08 RX ADMIN — MIDAZOLAM 1 MG: 1 INJECTION INTRAMUSCULAR; INTRAVENOUS at 13:05

## 2021-12-08 RX ADMIN — DEXMEDETOMIDINE HYDROCHLORIDE 4 MCG: 100 INJECTION, SOLUTION, CONCENTRATE INTRAVENOUS at 14:16

## 2021-12-08 RX ADMIN — Medication 80 MCG: at 14:47

## 2021-12-08 RX ADMIN — Medication 10 MG: at 15:24

## 2021-12-08 RX ADMIN — DEXMEDETOMIDINE HYDROCHLORIDE 2 MCG: 100 INJECTION, SOLUTION, CONCENTRATE INTRAVENOUS at 14:38

## 2021-12-08 RX ADMIN — Medication 80 MCG: at 15:15

## 2021-12-08 RX ADMIN — CLINDAMYCIN PHOSPHATE 600 MG: 600 INJECTION, SOLUTION INTRAVENOUS at 13:25

## 2021-12-08 RX ADMIN — FENTANYL CITRATE 50 MCG: 50 INJECTION, SOLUTION INTRAMUSCULAR; INTRAVENOUS at 13:47

## 2021-12-08 RX ADMIN — PROPOFOL 50 MG: 10 INJECTION, EMULSION INTRAVENOUS at 13:18

## 2021-12-08 RX ADMIN — Medication 80 MCG: at 15:50

## 2021-12-08 RX ADMIN — Medication 10 MG: at 15:50

## 2021-12-08 RX ADMIN — Medication 80 MCG: at 15:08

## 2021-12-08 RX ADMIN — ROCURONIUM BROMIDE 5 MG: 10 SOLUTION INTRAVENOUS at 13:09

## 2021-12-08 RX ADMIN — DEXAMETHASONE SODIUM PHOSPHATE 8 MG: 4 INJECTION, SOLUTION INTRAMUSCULAR; INTRAVENOUS at 13:35

## 2021-12-08 RX ADMIN — SUGAMMADEX 200 MG: 100 INJECTION, SOLUTION INTRAVENOUS at 16:04

## 2021-12-08 RX ADMIN — SODIUM CHLORIDE, POTASSIUM CHLORIDE, SODIUM LACTATE AND CALCIUM CHLORIDE: 600; 310; 30; 20 INJECTION, SOLUTION INTRAVENOUS at 13:05

## 2021-12-08 RX ADMIN — ACETAMINOPHEN 1000 MG: 500 TABLET ORAL at 12:25

## 2021-12-08 RX ADMIN — SODIUM CHLORIDE, POTASSIUM CHLORIDE, SODIUM LACTATE AND CALCIUM CHLORIDE: 600; 310; 30; 20 INJECTION, SOLUTION INTRAVENOUS at 14:38

## 2021-12-08 RX ADMIN — SODIUM CHLORIDE, POTASSIUM CHLORIDE, SODIUM LACTATE AND CALCIUM CHLORIDE 125 ML/HR: 600; 310; 30; 20 INJECTION, SOLUTION INTRAVENOUS at 12:25

## 2021-12-08 RX ADMIN — HYDROMORPHONE HYDROCHLORIDE 0.5 MG: 2 INJECTION, SOLUTION INTRAMUSCULAR; INTRAVENOUS; SUBCUTANEOUS at 14:40

## 2021-12-08 RX ADMIN — LIDOCAINE HYDROCHLORIDE 80 MG: 20 INJECTION, SOLUTION EPIDURAL; INFILTRATION; INTRACAUDAL; PERINEURAL at 13:09

## 2021-12-08 RX ADMIN — FENTANYL CITRATE 50 MCG: 50 INJECTION, SOLUTION INTRAMUSCULAR; INTRAVENOUS at 13:09

## 2021-12-08 RX ADMIN — SUCCINYLCHOLINE CHLORIDE 140 MG: 20 INJECTION, SOLUTION INTRAMUSCULAR; INTRAVENOUS at 13:12

## 2021-12-08 RX ADMIN — ROCURONIUM BROMIDE 35 MG: 10 SOLUTION INTRAVENOUS at 13:20

## 2021-12-08 RX ADMIN — HYDROMORPHONE HYDROCHLORIDE 0.5 MG: 2 INJECTION, SOLUTION INTRAMUSCULAR; INTRAVENOUS; SUBCUTANEOUS at 15:08

## 2021-12-08 RX ADMIN — ONDANSETRON HYDROCHLORIDE 4 MG: 2 INJECTION, SOLUTION INTRAMUSCULAR; INTRAVENOUS at 14:47

## 2021-12-08 RX ADMIN — MEPERIDINE HYDROCHLORIDE 12.5 MG: 25 INJECTION INTRAMUSCULAR; INTRAVENOUS; SUBCUTANEOUS at 16:40

## 2021-12-08 RX ADMIN — DEXMEDETOMIDINE HYDROCHLORIDE 4 MCG: 100 INJECTION, SOLUTION, CONCENTRATE INTRAVENOUS at 14:06

## 2021-12-08 RX ADMIN — ROCURONIUM BROMIDE 10 MG: 10 SOLUTION INTRAVENOUS at 14:41

## 2021-12-08 RX ADMIN — Medication 10 MG: at 15:31

## 2021-12-08 NOTE — ROUTINE PROCESS
Patient: Dev Cramer MRN: [id number]  SSN: [id number]   YOB: 1970  Age: 46 y.o. Sex: female     Patient is status post Procedure(s) with comments:  LIPOSUCTION WITH J-PLASMA INNER, OUTER THIGHS AND FLANKS  BILATERAL - First back of legs, flanks, 2nd abdomen, thighs inner and outer. Surgeon(s) and Role:     Verlee Peabody, MD - Primary    Local/Dose/Irrigation:                 Peripheral IV 12/08/21 Left Arm (Active)   Site Assessment Clean, dry, & intact 12/08/21 1214   Phlebitis Assessment 0 12/08/21 1214   Dressing Status Clean, dry, & intact 12/08/21 1214   Dressing Type Transparent 12/08/21 1214            Airway - Endotracheal Tube 12/08/21 Oral (Active)                   Dressing/Packing:       Splint/Cast:  ]    Other: xerofaorm, 4x4's.  Tegaderm, kerlix wrap and sce wrap both legs, abdomonal binder placed

## 2021-12-08 NOTE — BRIEF OP NOTE
Brief Postoperative Note    Patient: Poornima Bagley  YOB: 1970  MRN: [id number]    Date of Procedure: 12/8/2021     Pre-Op Diagnosis: COSMETIC    Post-Op Diagnosis: Same as preoperative diagnosis.       Procedure(s):  LIPOSUCTION WITH J-PLASMA INNER, OUTER THIGHS AND FLANKS  BILATERAL    Surgeon(s):  Clint Romero MD    Surgical Assistant: None    Anesthesia: General     Estimated Blood Loss (mL): less than 50     Complications: none    Specimens: * No specimens in log *     Implants: * No implants in log *    Drains: * No LDAs found *    Findings: none    Electronically Signed by Crista Dubose MD on 12/8/2021 at 4:17 PM    Tumescent:  2500 ml  Lipoaspirate:   1500ml  Jplasma  80%/3L    12kJ

## 2021-12-08 NOTE — ANESTHESIA PREPROCEDURE EVALUATION
Anesthetic History     PONV          Review of Systems / Medical History  Patient summary reviewed, nursing notes reviewed and pertinent labs reviewed    Pulmonary            Asthma : well controlled       Neuro/Psych         Headaches     Cardiovascular                  Exercise tolerance: >4 METS     GI/Hepatic/Renal     GERD           Endo/Other        Arthritis     Other Findings              Physical Exam    Airway  Mallampati: III  TM Distance: 4 - 6 cm  Neck ROM: normal range of motion   Mouth opening: Diminished (comment)     Cardiovascular    Rhythm: regular  Rate: normal         Dental         Pulmonary  Breath sounds clear to auscultation               Abdominal  GI exam deferred       Other Findings            Anesthetic Plan    ASA: 3  Anesthesia type: general          Induction: Intravenous  Anesthetic plan and risks discussed with: Patient

## 2021-12-08 NOTE — PERIOP NOTES
Discharge instructions reviewed with patient and spouse at bedside. Opportunity for questions and clarification provided. Patient and spouse verbalized understanding of discharge instructions and had no additional questions or concerns. Paper copy of discharge instructions provided. Patient's vital signs within normal limits. Patient denies post-operative pain. Patient tolerating PO intake, denies nausea. Peripheral IV removed with no signs of infiltration. Rosa catheter removed. Patient discharged by RN via wheelchair to 's care/car and prior home environment from Phase 1 area.

## 2021-12-08 NOTE — DISCHARGE INSTRUCTIONS
DISCHARGE INSTRUCTIONS     Follow-up with Dr. Sreekanth Dunham in 1 week. Call  [phone]    Remove dressing(s) in 1 days . Apply antibiotic ointment to incisions twice daily   May cover with bandaid  Wear garment,   Wear binder on top of garment    May Shower (Do not take Boyds Roxo)  In   days     Activity:     No strenous activity, No lifting greater then 10 lbs    Call for: fever, chills, vomiting, foul smelling drainage, bleeding, difficulty breathing, leg cramps  [phone]       Please call [facility] [phone]  to make arrangements from PACU. Quick Reference Instructions for After Liposuction      What to Expect:  o Drainage:   -  Its not uncommon to have a significant amount of light red or yellow color drainage the night following surgery and into the next day. -   I recommend sleeping on a towel or jana protectors so as protect your bedding.   - Change gauze as needed, may also use maxi pads   o Pain:    - The local anesthetic medicine will most likely wear off about 1-2 hrs after surgery.     - You should take some oral pain medication (dilaudid, Percocet, hydromorphine, oxycondeine) when you get home and before going to bed. Follow the instructions on the bottle. o Swelling:  - Expect to feel swollen, distended and bruised following surgery.  - You will see a change in the area immediately after surgery and the next day following removal of your garment to shower. However, dont be alarmed if the area swells further the following day and even may be assymetric. This is all part of the normal healing process. Most irregularities and swelling will resolve over the following 1-3 months.   - ICE/HEAT:  I do not recommend any ice therapy initially post surgery. You should also AVOID  heating pads as the skin is somewhat numb and can be burned. o Bruising:    - Its not uncommon to have bruising even extending into the genital region.   There should not be any very large tense bruises isolated to one area. If you are concerned called Dr. Galen Cervantes:  o There are No food restrictions after anesthesia. However, I would recommend eating small bland meals at first and drinking plenty of fluids. o Avoid alcohol for the first 3 days post surgery and while you are taking the pain medications or valium     No Smoking or Nicotine products for 2 weeks post liposuction     Dressing Changes/Wound Care:  o Remove binder and dressings the morning after surgery  o You may use the adhesive lotion remover to remove the surgical prep  o You may gently shower  o Apply Antibiotic ointment to the incisions twice daily  o Cover with 4x4 gauze & paper tape  or Bandaids     Garment/Compression:  o You should keep the garment on the first night of surgery and wear the garment around 22 hours a day.    o It should be snug and comforting, not too tight, not too loose  o You may wear a clean cotton t-shirt under the garment, this helps prevent irritation  o If you notice the garment causing particular creases or pressure on one area of the skin, try to readjust the garment to allow for an even distribution of pressure     Showering:  o You may shower 24 hrs after the surgery  o Do not submerge in water. Do not take a bath or swim until instructed by Dr. South Zuñiga. Typically once sutures are removed.  Activity  o Do not perform any strenuous activity or heavy lifting (greater than 10 lbs) for 2 weeks post surgery. Anything that raises your blood pressure can increase the risk for bleeding.  o Exercise:  you may begin walking anytime after your surgery, light jogging the following week. But you should refrain from serious exercise until cleared by Dr. South Zuñiga (typically 2-3 weeks)     Driving: You may not drive while on pain medication or valium. You should feel good enough that you are confident you can control the vehicle and slam on the brakes if needed.  Medication  o Antibiotics:  - Oral Antibiotics: typically Keflex/ciprofloxin/cephalexin is prescribed. Take these the night of surgery and continue as instructed on the bottle until the prescription is completed. - Topical Antibiotic:  Either a prescription for bactroban/mupirocin will be written or over the counter Bacitracinshould be applied twice daily to the incisions. o Dilaudid/Hydromorphine/oxycodone/Percocet:   One of these will be prescribe as your pain medication. Typically you can take 1-2 pills every 4-6hrs as needed. See instructions on bottle   o Valium/Diazepam:  may also be prescribed, this medication helps with muscle pain, anxiety and sleep. Some patients find it helps a lot with pain as well. See instructions on the bottle, but usually you may take this every 6 hrs as need. Be careful taking this simultaneously with the pain medication. Initially space out taking the pain medication and valium by about one hour.    o Tylenol:  you may take Tylenol if you are prescribed dilaudid/hydromorphine. Do not exceed 3250 mg of Tylenol in a day and do not take it if prescribed Percocet/oxycodone/hydrocodone   o Ibuprofen/aleve/naproxen/aspirin/advil:  Donot take, until 2 weeks after surgery  o Nausea/Vomiting: This is not typical after awake surgery, but is common after general anesthesia. If you experience this Zofran or Phenergan can be prescribed  o Stool Softener: It is recommended you take a stool softener as the pain medication can cause constipation. I recommend either Senna-S or Colace, two tablets twice daily      Follow Up:  o Typically follow up is around 7-10 days post liposuction  o You can call the office to arrange an appointment     Urgent Issues of Concern:After Hours Paging  [phone]    o For Urgent issues contact Dr. Bekah Webster and 911 if you believe its a true emergency. o Call with any questions.   During the day you can reach us at our office number or email during the day   - [place]:         [phone]  [email or web address]  [employer] [place]:  [phone]  [email or web address]  o Urgent Issues:    - Fever above 101.5  - Large amounts of bright red blood (more than one gauze pad)  - Difficult Breathing, Chest Pain, Shortness of Breath  - Increased Swelling in the Legs  - Calf or Leg Pain   - Redness or Pus at the Surgical Site      DISCHARGE SUMMARY from Nurse    You received 1000 mg of IV tylenol (acetaminophen) during your procedure today at 12:25 PM. Please do not have any additional tylenol (acetaminophen) or tylenol containing products for 6 hours, or no sooner than 6:25 PM.     You have a scopolamine patch behind your left ear. This is to help prevent nausea. This patch can be worn for up to 3 days. The most common side effects are dry mouth/dry eyes. If you are not experiencing nausea and are experiencing side effects you may remove the patch sooner. Please remove the patch no later than Saturday PM. Be sure to dispose of patch where children or pets cannot access it, wash your hands thoroughly, and do not touch your eyes. PATIENT INSTRUCTIONS:    After general anesthesia or intravenous sedation, for 24 hours or while taking prescription Narcotics:  · Limit your activities  · Do not drive and operate hazardous machinery  · Do not make important personal or business decisions  · Do  not drink alcoholic beverages  · If you have not urinated within 8 hours after discharge, please contact your surgeon on call.     Report the following to your surgeon:  · Excessive pain, swelling, redness or odor of or around the surgical area  · Temperature over 100.5  · Nausea and vomiting lasting longer than 4 hours or if unable to take medications  · Any signs of decreased circulation or nerve impairment to extremity: change in color, persistent  numbness, tingling, coldness or increase pain  · Any questions    What to do at Home:  Recommended Activity: See surgical instructions above from Dr. Michael Ramos. Recommended Diet: Resume regular diet as tolerated. Nothing heavy, greasy, fatty, or fried. Please make sure you have food on your stomach prior to taking narcotic pain medication. If you experience any of the following symptoms as noted above, please follow up with Dr. Michael Ramos. *  Please give a list of your current medications to your Primary Care Provider. *  Please update this list whenever your medications are discontinued, doses arechanged, or new medications (including over-the-counter products) are added. *  Please carry medication information at all times in case of emergency situations. Community Education:    These are general instructions for a healthy lifestyle:    No smoking/ No tobacco products/ Avoid exposure to second hand smoke. Surgeon General's Warning:  Quitting smoking now greatly reduces serious risk to your health. Obesity, smoking, and sedentary lifestyle greatly increases your risk for illness. A healthy diet, regular physical exercise & weight monitoring are important for maintaining a healthy lifestyle    You may be retaining fluid if you have a history of heart failure or if you experience any of the following symptoms:  Weight gain of 3 pounds or more overnight or 5 pounds in a week, increased swelling in our hands or feet or shortness of breath while lying flat in bed. Please call your doctor as soon as you notice any of these symptoms; do not wait until your next office visit. The discharge information has been reviewed with the patient and spouse. The patient and spouse verbalized understanding. Discharge medications reviewed with the patient and spouse and appropriate educational materials and side effects teaching were provided.   ___________________________________________________________________________________________________________________________________

## 2021-12-08 NOTE — ROUTINE PROCESS
Patient: Marin Lange MRN: [id number]  SSN: [id number]   YOB: 1970  Age: 46 y.o. Sex: female     Patient is status post Procedure(s) with comments:  LIPOSUCTION WITH J-PLASMA INNER, OUTER THIGHS AND FLANKS  BILATERAL - First back of legs, flanks, 2nd abdomen, thighs inner and outer.     Surgeon(s) and Role:     Albert Archer MD - Primary    Local/Dose/Irrigation: 3 bags of tumesense (2% Lidocaine in each bag with 1 amp of epi in each)                Peripheral IV 12/08/21 Left Arm (Active)   Site Assessment Clean, dry, & intact 12/08/21 1214   Phlebitis Assessment 0 12/08/21 1214   Dressing Status Clean, dry, & intact 12/08/21 1214   Dressing Type Transparent 12/08/21 1214            Airway - Endotracheal Tube 12/08/21 Oral (Active)                   Dressing/Packing:  Jase Elise, 4x4, tegaderm, abd    Splint/Cast:  ]    Other:

## 2021-12-08 NOTE — H&P
Pre-op History and Physical    CC: COSMETIC   HPI: 46y.o. year old female with COSMETIC deformiy presents  for Procedure(s):  LIPOSUCTION WITH J-PLASMA INNER, OUTER THIGHS AND FLANKS  BILATERAL. Please see clinic [facility] for full details.   Past medical history:   Past Medical History:   Diagnosis Date    Arrhythmia     MVP-NO MURMUR, \"HAS HOLE IN MY HEART\"    Arthritis     BACK AND NECK    Asthma     EXERCISE INDUCED WHEN PT WAS IN HER 20'S    COVID-19 01/01/2021    Diverticulosis     GERD (gastroesophageal reflux disease)     NO MEDS CURRENTLY    History of endometriosis 05/27/2008    History of herpes simplex infection     Hx of migraines     Hypotension     IBS (irritable bowel syndrome)     Meningioma (HCC)     Mitral valve prolapse     Nausea & vomiting       Past surgical history:   Past Surgical History:   Procedure Laterality Date    HX BREAST AUGMENTATION      breast implants    HX CHOLECYSTECTOMY      HX CRANIOTOMY  02/13/2018    meningioma    HX HERNIA REPAIR  12/01/2015    mesh    HX HYSTERECTOMY  05/27/2008    partial    HX SALPINGO-OOPHORECTOMY  07/27/2012    BSO, lysis of adhesions, cystoscopy      Family history:   Family History   Problem Relation Age of Onset    Stroke Mother     Hypertension Mother     Heart Disease Mother         CHF    Pacemaker Mother     Stroke Father     Diabetes Father     Hypertension Father     Heart Attack Father     Heart Disease Father     Mult Sclerosis Sister     Colon Cancer Paternal Grandfather 52    Parkinson's Disease Brother     Diabetes Sister     Malignant Hyperthermia Neg Hx     Pseudocholinesterase Deficiency Neg Hx     Delayed Awakening Neg Hx     Post-op Nausea/Vomiting Neg Hx     Emergence Delirium Neg Hx     Post-op Cognitive Dysfunction Neg Hx     Other Neg Hx     Anesth Problems Neg Hx       Social history:   Social History     Socioeconomic History    Marital status:      Spouse name: Not on file    Number of children: Not on file    Years of education: Not on file    Highest education level: Not on file   Occupational History    Not on file   Tobacco Use    Smoking status: Never Smoker    Smokeless tobacco: Never Used   Vaping Use    Vaping Use: Never used   Substance and Sexual Activity    Alcohol use: Yes     Alcohol/week: 1.0 standard drink     Types: 1 Glasses of wine per week     Comment: 1 GLASS WINE WEEKLY    Drug use: No    Sexual activity: Yes     Partners: Male     Birth control/protection: Surgical     Comment: hyst   Other Topics Concern    Not on file   Social History Narrative    Not on file     Social Determinants of Health     Financial Resource Strain:     Difficulty of Paying Living Expenses: Not on file   Food Insecurity:     Worried About Running Out of Food in the Last Year: Not on file    Kasi of Food in the Last Year: Not on file   Transportation Needs:     Lack of Transportation (Medical): Not on file    Lack of Transportation (Non-Medical):  Not on file   Physical Activity:     Days of Exercise per Week: Not on file    Minutes of Exercise per Session: Not on file   Stress:     Feeling of Stress : Not on file   Social Connections:     Frequency of Communication with Friends and Family: Not on file    Frequency of Social Gatherings with Friends and Family: Not on file    Attends Sikh Services: Not on file    Active Member of [address] [place] [address] or Organizations: Not on file    Attends Club or Organization Meetings: Not on file    Marital Status: Not on file   Intimate Partner Violence:     Fear of Current or Ex-Partner: Not on file    Emotionally Abused: Not on file    Physically Abused: Not on file    Sexually Abused: Not on file   Housing Stability:     Unable to Pay for Housing in the Last Year: Not on file    Number of [place] in the Last Year: Not on file    Unstable Housing in the Last Year: Not on file      Home Medications:   Prior to Admission medications Medication Sig Start Date End Date Taking? Authorizing Provider   Lactobacillus acidophilus (PROBIOTIC) 10 billion cell cap Take  by mouth. Yes Provider, Historical   cyanocobalamin 1,000 mcg tablet Take 1,000 mcg by mouth daily. Yes Provider, Historical   multivitamin (ONE A DAY) tablet Take 1 Tab by mouth daily. Yes Provider, Historical   Qysuxjzv-Vkzg-Kizdjm-Hyalur Ac 191-740-11-2 mg cap Take 2 Tabs by mouth daily. Yes Provider, Historical   ascorbic acid, vitamin C, (VITAMIN C) 500 mg tablet Take 500 mg by mouth daily. Yes Provider, Historical   VITAMIN E, DL,TOCOPHERYL ACET, (VITAMIN E ACETATE) 400 unit Cap capsule Take 400 Units by mouth daily. Yes Provider, Historical   Cetirizine 10 mg Cap Take 10 mg by mouth daily. Yes Provider, Historical   Cholecalciferol, Vitamin D3, (VITAMIN D3) 1,000 unit Cap Take 5,000 Units by mouth daily. Yes Provider, Historical   calcium citrate 200 mg (950 mg) tablet Take 630 mg by mouth daily. Yes Provider, Historical      Allergies: Allergies   Allergen Reactions    Pcn [Penicillins] Hives     PT WAS TOLD AS A CHILD. Patient screened for any delayed non-IgE-mediated reaction to PCN.         Patient notes the following:    No delayed non-IgE-mediated reaction to PCN        Vancomycin Itching      Review of systems:  Denies headache, fever, chills, weight change, congestion, sore throat, chest pain, shortness of breath, nausea, vomiting, diarrhea, constipation, abdominal pain, generalized weakness, muscle or joint pain, and rash. Physical Exam:  Vitals: Blood pressure 93/60, pulse 80, temperature 98.4 °F (36.9 °C), resp. rate 16, weight 57 kg (125 lb 10.6 oz), SpO2 97 %.    General: awake and alert, NAD  Neck: supple  Cor: RRR  Lungs: clear  Abdomen: soft, non-tender, non-distended  Extremities: no edema, lipo cutaneous dystrophy  Breast:  Skin:   HEENT:      Impression: COSMETIC    Plan:  Procedure(s):  LIPOSUCTION WITH J-PLASMA INNER, OUTER THIGHS AND FLANKS  BILATERAL  . The patient was counseled at length about the risks of lawanda Covid-19 during their perioperative period and any recovery window from their procedure. The patient was made aware that lawanda Covid-19  may worsen their prognosis for recovering from their procedure and lend to a higher morbidity and/or mortality risk. All material risks, benefits, and reasonable alternatives including postponing the procedure were discussed. The patient does  wish to proceed with the procedure at this time.

## 2021-12-08 NOTE — ANESTHESIA POSTPROCEDURE EVALUATION
Post-Anesthesia Evaluation and Assessment    Patient: Nimo Wang MRN: [id number]  SSN: [id number]    YOB: 1970  Age: 46 y.o. Sex: female      I have evaluated the patient and they are stable and ready for discharge from the PACU. Cardiovascular Function/Vital Signs  Visit Vitals  /71   Pulse 98   Temp 36.7 °C (98 °F)   Resp 15   Wt 57 kg (125 lb 10.6 oz)   SpO2 97%   BMI 22.98 kg/m²       Patient is status post General anesthesia for Procedure(s) with comments:  LIPOSUCTION WITH J-PLASMA INNER, OUTER THIGHS AND FLANKS  BILATERAL - First back of legs, flanks, 2nd abdomen, thighs inner and outer. Nausea/Vomiting: None    Postoperative hydration reviewed and adequate. Pain:  Pain Scale 1: Numeric (0 - 10) (12/08/21 1126)  Pain Intensity 1: 0 (12/08/21 1126)   Managed    Neurological Status:   Neuro (WDL): Within Defined Limits (12/08/21 1126)   At baseline    Mental Status, Level of Consciousness: Alert and  oriented to person, place, and time    Pulmonary Status:   O2 Device: None (12/08/21 1628)   Adequate oxygenation and airway patent    Complications related to anesthesia: None    Post-anesthesia assessment completed. No concerns    Signed By: Trudy Vanegas MD     December 8, 2021              Procedure(s):  LIPOSUCTION WITH J-PLASMA INNER, OUTER THIGHS AND FLANKS  BILATERAL. general    <BSHSIANPOST>    INITIAL Post-op Vital signs:   Vitals Value Taken Time   /61 12/08/21 1700   Temp 36.7 °C (98 °F) 12/08/21 1628   Pulse 109 12/08/21 1706   Resp 16 12/08/21 1706   SpO2 97 % 12/08/21 1706   Vitals shown include unvalidated device data.

## 2021-12-09 NOTE — OP NOTES
[address]   OPERATIVE REPORT    Name:  Avery Haney  MR#:  [id number]  :  1970  ACCOUNT #:  [de-identified]  DATE OF SERVICE:  2021    LOCATION:  [facility] Ambulatory Operating Room. SERVICE:  Plastic Surgery. 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 PROCEDURE PERFORMED:  Liposuction of inner and outer thighs and flanks with Renuvion. SURGEON:  Maximiliano Demarco MD    ASSISTANT:  None. ANESTHESIA:  General.    COMPLICATIONS:  None. SPECIMENS REMOVED:  Lipoaspirate. IMPLANTS:  None. ESTIMATED BLOOD LOSS:  minimal.    TUMESCENT:  2500 mL. LIPOASPIRATE:  1500 mL. DRAINS:  Rosa catheter, discontinued at the end of the case. BRIEF INDICATIONS:  A 38-year-old female seeking cosmetic improvement in inner thighs, outer thighs, and flanks. Otherwise healthy, no significant major medical problems besides mitral valve prolapse. Risks and benefits of the procedure were thoroughly discussed with the patient including, but not limited to bleeding, infection, seroma, hematoma, PE, DVT, contour irregularities, damage to surrounding structures. The patient was marked in the preoperative area for areas of lipodystrophy of lower back, flanks, inner and outer thighs. PROCEDURE:  The patient underwent general anesthesia and was placed in the prone position. Her lower back and flanks and inner and outer thighs were prepped and draped in sterile surgical fashion. Through 2 stab incisions in the lower back and 2 in the lower buttocks crease, tumescent solution was infiltrated throughout the lower back, flanks, outer thighs, banana rolls, and inner thighs. A total of 2 L was used. This was followed by lipoaspiration with SoundCuret power-assisted liposuction device. 3 mm and 4 mm cannulas were used.   Approximately 350 mL was obtained from each flank, lower back region. 200 mL was obtained from the outer thighs, banana rolls, and inner thighs from the posterior aspect. Approximately 100 mL was apirated. This was followed by Taylor Pulse at the setting of 80% power, 3 L of flow, 5 passes subcutaneously were performed of all areas. Approximately 8 kilojoules of energy was used. Approximately 3 of those in the flanks and lower back region, and the rest going outside of posterior, 1.5 in the outer thighs and banana rolls. Additional aspiration was performed. Incision was closed with 4-0 nylon. The patient was flipped into a supine position. Inner thighs and outer thighs were prepped and draped in sterile surgical fashion. Additional 500 mL of tumescent was infiltrated throughout the inner thighs through stab incisions. Lipoaspiration was performed  of the outer thighs in the anterior aspect. Approximately another 15 mL per side was obtained here. Additional aspiration of approximately 200 mL from the inner thigh was obtained, bringing total lipoaspirate to 1500 mL. J-plasma was performed  kilojoules of energy, 4 in the thigh. Incisions were closed with 4-0 nylon sutures. The patient was wrapped in Ace wraps and an abdominal binder, allowed to recover.       MD ROXIE [email or web address]/[email or web address]  D:  12/08/2021 16:28  T:  12/09/2021 2:36  JOB #:  [id number]

## 2022-03-18 PROBLEM — K43.9 SPIGELIAN HERNIA: Status: ACTIVE | Noted: 2021-03-23

## 2022-03-18 PROBLEM — K46.9 ABDOMINAL HERNIA: Status: ACTIVE | Noted: 2021-03-23

## 2022-03-19 PROBLEM — D49.6 BRAIN TUMOR (HCC): Status: ACTIVE | Noted: 2018-02-13

## 2022-03-19 PROBLEM — I34.1 MITRAL VALVE PROLAPSE: Status: ACTIVE | Noted: 2021-03-23

## 2022-03-19 PROBLEM — Z82.49 FAMILY HISTORY OF PREMATURE CAD: Status: ACTIVE | Noted: 2021-02-24

## 2022-03-20 PROBLEM — Q21.12 PFO (PATENT FORAMEN OVALE): Status: ACTIVE | Noted: 2021-03-23

## 2022-03-20 PROBLEM — Z86.0100 HISTORY OF COLON POLYPS: Status: ACTIVE | Noted: 2018-09-27

## 2025-04-22 ENCOUNTER — CLINICAL DOCUMENTATION (OUTPATIENT)
Age: 55
End: 2025-04-22

## (undated) DEVICE — CODMAN® RANEY SCALP CLIPS 20 UNITS OF 10 CLIPS: Brand: CODMAN®

## (undated) DEVICE — MEDI-VAC NON-CONDUCTIVE SUCTION TUBING: Brand: CARDINAL HEALTH

## (undated) DEVICE — Device

## (undated) DEVICE — AMD ANTIMICROBIAL SUPER SPONGES,MEDIUM: Brand: KERLIX

## (undated) DEVICE — DURASEAL® EXACT SPINAL SEALANT SYSTEM 5ML 5 PACK: Brand: DURASEAL EXACT SPINAL SEALANT SYSTEM

## (undated) DEVICE — SOLUTION IRRIG 1000ML 0.9% SOD CHL USP POUR PLAS BTL

## (undated) DEVICE — TRAY PREP DRY W/ PREM GLV 2 APPL 6 SPNG 2 UNDPD 1 OVERWRAP

## (undated) DEVICE — INTENDED FOR TISSUE SEPARATION, AND OTHER PROCEDURES THAT REQUIRE A SHARP SURGICAL BLADE TO PUNCTURE OR CUT.: Brand: BARD-PARKER ® CARBON RIB-BACK BLADES

## (undated) DEVICE — STERILE POLYISOPRENE POWDER-FREE SURGICAL GLOVES: Brand: PROTEXIS

## (undated) DEVICE — 1010 S-DRAPE TOWEL DRAPE 10/BX: Brand: STERI-DRAPE™

## (undated) DEVICE — SURGIFOAM SPNG SZ 100

## (undated) DEVICE — 8FR FRAZIER SUCTION HANDLE: Brand: CARDINAL HEALTH

## (undated) DEVICE — GLOVE ORANGE PI 7 1/2   MSG9075

## (undated) DEVICE — COVER SURG EQUIP DRP MICSCP 118IN LEN 43IN W ZEISS OPMI

## (undated) DEVICE — SYRINGE MED 20ML STD CLR PLAS LUERLOCK TIP N CTRL DISP

## (undated) DEVICE — BAND RUB 1/8X2.5IN STRL --

## (undated) DEVICE — SPONGE GZ W4XL4IN COT 12 PLY TYP VII WVN C FLD DSGN

## (undated) DEVICE — DRAPE SHEET, X-LARGE: Brand: CONVERTORS

## (undated) DEVICE — CATHETER DRNGE 32FR 4 WNG DISP FOR NEPHSTMY MALECOTS

## (undated) DEVICE — SPONGE HEMOSTAT CELLULS 4X8IN -- SURGICEL

## (undated) DEVICE — BLADE SURG HK MIC 0.38 MM FOR FN INCISION FEATHER

## (undated) DEVICE — MAYFIELD® DISPOSABLE ADULT SKULL PIN (PLASTIC BASE): Brand: MAYFIELD®

## (undated) DEVICE — SUTURE NRLN SZ 4-0 L18IN NONABSORBABLE BLK L13MM TF 1/2 CIR C584D

## (undated) DEVICE — NEEDLE HYPO 22GA L1.5IN BLK POLYPR HUB S STL REG BVL STR

## (undated) DEVICE — CRANIOTOMY DRAPE, STERILE: Brand: MEDLINE

## (undated) DEVICE — SYR IRR TOOM 50CC MTL W/ADPT --

## (undated) DEVICE — SUTURE VCRL SZ 2-0 L18IN ABSRB UD CT-1 L36MM 1/2 CIR J839D

## (undated) DEVICE — TOWEL: OR BLU 80/CS: Brand: MEDICAL ACTION INDUSTRIES

## (undated) DEVICE — PACKING 8004000 NEURAY 200PK 13X13MM: Brand: NEURAY ®

## (undated) DEVICE — PACKING 8004004 NEURAY 200PK 13X38MM: Brand: NEURAY ®

## (undated) DEVICE — CATH IV STR 16GX1.25IN GRA -- PROTECTIV PLUS

## (undated) DEVICE — BANDAGE,GAUZE,BULKEE II,4.5"X4.1YD,STRL: Brand: MEDLINE

## (undated) DEVICE — CORD BPLR 2 PIN FLAT AND RND DISP

## (undated) DEVICE — SKIN MARKER,REGULAR TIP WITH RULER AND LABELS: Brand: DEVON

## (undated) DEVICE — CLAMP CRAN FIX IMPL 11MM TI

## (undated) DEVICE — PACKING 8004007 NEURAY 200PK 13X76MM: Brand: NEURAY ®

## (undated) DEVICE — MEDI-VAC NON-CONDUCTIVE SUCTION TUBING 6MM X 6.1M (20 FT.) L: Brand: CARDINAL HEALTH

## (undated) DEVICE — SSC BONE WAX: Brand: SSC BONE WAX

## (undated) DEVICE — TOWEL SURG W17XL27IN STD BLU COT NONFENESTRATED PREWASHED

## (undated) DEVICE — NEEDLE HYPO 22GA L1.5IN BLK S STL HUB POLYPR SHLD REG BVL

## (undated) DEVICE — HANDLE LT SNAP ON ULT DURABLE LENS FOR TRUMPF ALC DISPOSABLE

## (undated) DEVICE — PAD,ABDOMINAL,5"X9",ST,LF,25/BX: Brand: MEDLINE INDUSTRIES, INC.

## (undated) DEVICE — KENDALL SCD EXPRESS SLEEVES, KNEE LENGTH, MEDIUM: Brand: KENDALL SCD

## (undated) DEVICE — 10FR FRAZIER SUCTION HANDLE: Brand: CARDINAL HEALTH

## (undated) DEVICE — CATHETER IV 14GA L5.25IN PERIPH ORNG FEP POLYMER 3 BVL

## (undated) DEVICE — SYR 10ML CTRL LR LCK NSAF LF --

## (undated) DEVICE — PACK,ORTOHMAX/CVMAX,UNIVERSAL,5/CS: Brand: MEDLINE

## (undated) DEVICE — SUTURE NRLN SZ 1 L18IN NONABSORBABLE BLK L36MM CT-1 1/2 CIR C520D

## (undated) DEVICE — Z CONVERTED USE 2276507 CONNECTOR TBNG POLYPR 5IN1 TOUGH SHATTERPROOF CLN FOR

## (undated) DEVICE — DRESSING,GAUZE,XEROFORM,CURAD,1"X8",ST: Brand: CURAD

## (undated) DEVICE — SYRINGE BLB 50CC IRRIG PLIABLE FNGR FLNG GRAD FLSK DISP

## (undated) DEVICE — 3M™ IOBAN™ 2 ANTIMICROBIAL INCISE DRAPE 6650EZ: Brand: IOBAN™ 2

## (undated) DEVICE — GOWN,AURORA,NONRNF,XL,30/CS: Brand: MEDLINE